# Patient Record
Sex: FEMALE | Race: WHITE | NOT HISPANIC OR LATINO | Employment: FULL TIME | URBAN - METROPOLITAN AREA
[De-identification: names, ages, dates, MRNs, and addresses within clinical notes are randomized per-mention and may not be internally consistent; named-entity substitution may affect disease eponyms.]

---

## 2023-03-20 ENCOUNTER — OFFICE VISIT (OUTPATIENT)
Dept: PRIMARY CARE | Facility: CLINIC | Age: 68
End: 2023-03-20
Payer: MEDICARE

## 2023-03-20 VITALS
SYSTOLIC BLOOD PRESSURE: 122 MMHG | HEIGHT: 59 IN | BODY MASS INDEX: 22.18 KG/M2 | TEMPERATURE: 97.6 F | WEIGHT: 110 LBS | DIASTOLIC BLOOD PRESSURE: 73 MMHG | OXYGEN SATURATION: 96 % | HEART RATE: 76 BPM

## 2023-03-20 DIAGNOSIS — R05.1 ACUTE COUGH: Primary | ICD-10-CM

## 2023-03-20 DIAGNOSIS — K52.9 ACUTE GASTROENTERITIS: ICD-10-CM

## 2023-03-20 PROCEDURE — 1036F TOBACCO NON-USER: CPT | Performed by: NURSE PRACTITIONER

## 2023-03-20 PROCEDURE — 99213 OFFICE O/P EST LOW 20 MIN: CPT | Performed by: NURSE PRACTITIONER

## 2023-03-20 PROCEDURE — 1159F MED LIST DOCD IN RCRD: CPT | Performed by: NURSE PRACTITIONER

## 2023-03-20 RX ORDER — LEVOTHYROXINE SODIUM 25 UG/1
TABLET ORAL
COMMUNITY
Start: 2021-08-16 | End: 2023-04-27 | Stop reason: SDUPTHER

## 2023-03-20 RX ORDER — OMEPRAZOLE 40 MG/1
40 CAPSULE, DELAYED RELEASE ORAL
Qty: 30 CAPSULE | Refills: 11 | Status: SHIPPED | OUTPATIENT
Start: 2023-03-20 | End: 2023-05-09

## 2023-03-20 RX ORDER — CLOBETASOL PROPIONATE 0.5 MG/G
CREAM TOPICAL 2 TIMES DAILY
COMMUNITY
Start: 2022-11-30 | End: 2023-05-09

## 2023-03-20 RX ORDER — LOTEPREDNOL ETABONATE 5 MG/G
OINTMENT OPHTHALMIC
COMMUNITY
Start: 2022-11-29 | End: 2023-05-09

## 2023-03-20 RX ORDER — ATORVASTATIN CALCIUM 20 MG/1
20 TABLET, FILM COATED ORAL DAILY
COMMUNITY
Start: 2022-11-02 | End: 2023-05-09

## 2023-03-20 RX ORDER — DOXYCYCLINE 100 MG/1
100 CAPSULE ORAL 2 TIMES DAILY
Qty: 14 CAPSULE | Refills: 0 | Status: SHIPPED | OUTPATIENT
Start: 2023-03-20 | End: 2023-03-27

## 2023-03-20 RX ORDER — DOXYCYCLINE 100 MG/1
CAPSULE ORAL
COMMUNITY
Start: 2023-02-04 | End: 2023-03-20 | Stop reason: ALTCHOICE

## 2023-03-20 RX ORDER — ATORVASTATIN CALCIUM 40 MG/1
1 TABLET, FILM COATED ORAL DAILY
COMMUNITY
Start: 2022-08-09 | End: 2023-08-21 | Stop reason: SDUPTHER

## 2023-03-20 RX ORDER — ALBUTEROL SULFATE 90 UG/1
AEROSOL, METERED RESPIRATORY (INHALATION)
COMMUNITY
Start: 2022-08-09 | End: 2024-01-09 | Stop reason: SDUPTHER

## 2023-03-20 RX ORDER — TRAZODONE HYDROCHLORIDE 50 MG/1
50 TABLET ORAL NIGHTLY PRN
Qty: 30 TABLET | Refills: 0 | Status: SHIPPED | OUTPATIENT
Start: 2023-03-20 | End: 2023-04-27 | Stop reason: SDUPTHER

## 2023-03-20 RX ORDER — CYCLOSPORINE 0.5 MG/ML
EMULSION OPHTHALMIC
COMMUNITY
Start: 2021-10-11

## 2023-03-20 RX ORDER — GABAPENTIN 300 MG/1
1 CAPSULE ORAL 2 TIMES DAILY
COMMUNITY
Start: 2021-08-16 | End: 2023-04-27 | Stop reason: SDUPTHER

## 2023-03-20 RX ORDER — BENZONATATE 100 MG/1
1 CAPSULE ORAL 3 TIMES DAILY PRN
COMMUNITY
Start: 2021-12-22 | End: 2023-03-20 | Stop reason: SDUPTHER

## 2023-03-20 RX ORDER — BENZONATATE 100 MG/1
100 CAPSULE ORAL 3 TIMES DAILY PRN
Qty: 30 CAPSULE | Refills: 0 | Status: SHIPPED | OUTPATIENT
Start: 2023-03-20 | End: 2023-05-09

## 2023-03-20 ASSESSMENT — PATIENT HEALTH QUESTIONNAIRE - PHQ9
1. LITTLE INTEREST OR PLEASURE IN DOING THINGS: NOT AT ALL
2. FEELING DOWN, DEPRESSED OR HOPELESS: NOT AT ALL
SUM OF ALL RESPONSES TO PHQ9 QUESTIONS 1 AND 2: 0

## 2023-03-20 ASSESSMENT — ENCOUNTER SYMPTOMS
DEPRESSION: 0
OCCASIONAL FEELINGS OF UNSTEADINESS: 0
LOSS OF SENSATION IN FEET: 0

## 2023-03-20 NOTE — PROGRESS NOTES
Subjective   Diane Goetz is a 67 y.o. female who presents for evaluation of URI.     Diane had COVID back in 2021. Since this time she has complained of being more susceptible to smells from chemicals. These cause her to cough. She follows with [facility] pulmonologist Dr. Lane. She had a CT chest recently and has a follow up next week to review. Last CT available in chart that I can see is from Jan 2022.   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 Covarrubias at [facility] Next Tuesday.   She is on albuterol 1x daily  She is on trellegy elliptia.  Currently 4 cigs a day.  She started working PT at [employer]      Objective   There were no vitals taken for this visit.  Physical Exam  Constitutional:       Appearance: Normal appearance.   HENT:      Right Ear: Tympanic membrane normal.      Left Ear: Tympanic membrane normal.   Eyes:      Conjunctiva/sclera: Conjunctivae normal.   Cardiovascular:      Rate and Rhythm: Normal rate and regular rhythm.   Pulmonary:      Effort: Pulmonary effort is normal.      Breath sounds: Normal breath sounds.   Abdominal:      Palpations: Abdomen is soft.   Neurological:      Mental Status: She is alert.   Psychiatric:         Mood and Affect: Mood normal.         Thought Content: Thought content normal.          Assessment/Plan   Diagnoses and all orders for this visit:  Acute cough  -     traZODone (Desyrel) 50 mg tablet; Take 1 tablet (50 mg) by mouth as needed at bedtime for sleep.  -     doxycycline (Vibramycin) 100 mg capsule; Take 1 capsule (100 mg) by mouth in the morning and 1 capsule (100 mg) before bedtime. Do all this for 7 days. Take with at least 8 ounces (large glass) of water, do not lie down for 30 minutes after.  -     benzonatate (Tessalon) 100 mg capsule; Take 1 capsule (100 mg) by mouth 3 times a day as needed for cough.  Acute gastroenteritis  -     omeprazole (PriLOSEC) 40 mg DR capsule; Take 1 capsule (40 mg) by mouth once daily in the  morning. Take before meals. Do not crush or chew.  Nasal rinses, humification/hot shower until mucous drains, increase fluid intake aiming for half your body weight in oz of water daily. This will help to thin mucous secretion and replace fluids that have been lost.  Warm, moist air, nasal saline hydration, avoidance of nasal irritants including cigarette smoke.  Sip hot or warm drinks, this may soothe nasal passages  Avoid extremely cold and dry air  Tylenol/Advil for muscle aches and fever.  Warm salt water gargles, throat lozenges and popsicle's for any sore throat.    Return in 1 week if not improving.

## 2023-03-29 ENCOUNTER — TELEPHONE (OUTPATIENT)
Dept: PRIMARY CARE | Facility: CLINIC | Age: 68
End: 2023-03-29
Payer: MEDICAID

## 2023-03-30 ENCOUNTER — TELEPHONE (OUTPATIENT)
Dept: PRIMARY CARE | Facility: CLINIC | Age: 68
End: 2023-03-30
Payer: MEDICAID

## 2023-03-30 NOTE — TELEPHONE ENCOUNTER
Pt called needing date of a lung something? I had a hard time hearing her on the phone. She stated she saw Barbi and I think she order it.

## 2023-03-30 NOTE — TELEPHONE ENCOUNTER
Pt needed the CT scan done on 01.20.22 faxed to Dr. Gallardo    Fax: [phone]    Faxed. Conformation received.

## 2023-04-20 ENCOUNTER — OFFICE VISIT (OUTPATIENT)
Dept: PRIMARY CARE | Facility: CLINIC | Age: 68
End: 2023-04-20
Payer: MEDICARE

## 2023-04-20 VITALS
TEMPERATURE: 98.1 F | RESPIRATION RATE: 16 BRPM | WEIGHT: 107.38 LBS | HEIGHT: 59 IN | HEART RATE: 90 BPM | BODY MASS INDEX: 21.65 KG/M2 | OXYGEN SATURATION: 94 % | SYSTOLIC BLOOD PRESSURE: 104 MMHG | DIASTOLIC BLOOD PRESSURE: 66 MMHG

## 2023-04-20 DIAGNOSIS — K21.9 GASTROESOPHAGEAL REFLUX DISEASE WITHOUT ESOPHAGITIS: Primary | ICD-10-CM

## 2023-04-20 DIAGNOSIS — J44.9 CHRONIC OBSTRUCTIVE PULMONARY DISEASE, UNSPECIFIED COPD TYPE (MULTI): ICD-10-CM

## 2023-04-20 DIAGNOSIS — E03.9 HYPOTHYROIDISM, UNSPECIFIED TYPE: ICD-10-CM

## 2023-04-20 DIAGNOSIS — Z12.11 ENCOUNTER FOR COLORECTAL CANCER SCREENING: ICD-10-CM

## 2023-04-20 DIAGNOSIS — Z00.00 HEALTHCARE MAINTENANCE: ICD-10-CM

## 2023-04-20 DIAGNOSIS — E78.5 HYPERLIPIDEMIA, MILD: ICD-10-CM

## 2023-04-20 DIAGNOSIS — Z12.12 ENCOUNTER FOR COLORECTAL CANCER SCREENING: ICD-10-CM

## 2023-04-20 DIAGNOSIS — G62.9 NEUROPATHY: ICD-10-CM

## 2023-04-20 DIAGNOSIS — Z11.59 NEED FOR HEPATITIS C SCREENING TEST: ICD-10-CM

## 2023-04-20 PROBLEM — H90.3 BILATERAL HIGH FREQUENCY SENSORINEURAL HEARING LOSS: Status: ACTIVE | Noted: 2023-04-20

## 2023-04-20 PROBLEM — M26.629 TMJ SYNDROME: Status: ACTIVE | Noted: 2023-04-20

## 2023-04-20 PROBLEM — C14.0 THROAT CANCER (MULTI): Status: ACTIVE | Noted: 2023-04-20

## 2023-04-20 PROBLEM — M85.80 OSTEOPENIA: Status: ACTIVE | Noted: 2023-04-20

## 2023-04-20 PROCEDURE — 99214 OFFICE O/P EST MOD 30 MIN: CPT | Performed by: STUDENT IN AN ORGANIZED HEALTH CARE EDUCATION/TRAINING PROGRAM

## 2023-04-20 PROCEDURE — 1159F MED LIST DOCD IN RCRD: CPT | Performed by: STUDENT IN AN ORGANIZED HEALTH CARE EDUCATION/TRAINING PROGRAM

## 2023-04-20 PROCEDURE — 1036F TOBACCO NON-USER: CPT | Performed by: STUDENT IN AN ORGANIZED HEALTH CARE EDUCATION/TRAINING PROGRAM

## 2023-04-20 PROCEDURE — 1170F FXNL STATUS ASSESSED: CPT | Performed by: STUDENT IN AN ORGANIZED HEALTH CARE EDUCATION/TRAINING PROGRAM

## 2023-04-20 PROCEDURE — G0439 PPPS, SUBSEQ VISIT: HCPCS | Performed by: STUDENT IN AN ORGANIZED HEALTH CARE EDUCATION/TRAINING PROGRAM

## 2023-04-20 PROCEDURE — 1160F RVW MEDS BY RX/DR IN RCRD: CPT | Performed by: STUDENT IN AN ORGANIZED HEALTH CARE EDUCATION/TRAINING PROGRAM

## 2023-04-20 RX ORDER — ACETAMINOPHEN 325 MG/1
650 TABLET ORAL EVERY 6 HOURS PRN
COMMUNITY
End: 2024-01-09 | Stop reason: SDUPTHER

## 2023-04-20 RX ORDER — PANTOPRAZOLE SODIUM 20 MG/1
20 TABLET, DELAYED RELEASE ORAL
COMMUNITY
Start: 2021-01-18 | End: 2023-04-20 | Stop reason: SDUPTHER

## 2023-04-20 RX ORDER — PANTOPRAZOLE SODIUM 20 MG/1
20 TABLET, DELAYED RELEASE ORAL
Qty: 90 TABLET | Refills: 3 | Status: SHIPPED | OUTPATIENT
Start: 2023-04-20 | End: 2023-05-09

## 2023-04-20 SDOH — ECONOMIC STABILITY: HOUSING INSECURITY
IN THE LAST 12 MONTHS, WAS THERE A TIME WHEN YOU DID NOT HAVE A STEADY PLACE TO SLEEP OR SLEPT IN A SHELTER (INCLUDING NOW)?: NO

## 2023-04-20 SDOH — ECONOMIC STABILITY: FOOD INSECURITY: WITHIN THE PAST 12 MONTHS, THE FOOD YOU BOUGHT JUST DIDN'T LAST AND YOU DIDN'T HAVE MONEY TO GET MORE.: NEVER TRUE

## 2023-04-20 SDOH — ECONOMIC STABILITY: FOOD INSECURITY: WITHIN THE PAST 12 MONTHS, YOU WORRIED THAT YOUR FOOD WOULD RUN OUT BEFORE YOU GOT MONEY TO BUY MORE.: NEVER TRUE

## 2023-04-20 SDOH — HEALTH STABILITY: PHYSICAL HEALTH: ON AVERAGE, HOW MANY MINUTES DO YOU ENGAGE IN EXERCISE AT THIS LEVEL?: 0 MIN

## 2023-04-20 SDOH — ECONOMIC STABILITY: INCOME INSECURITY: IN THE LAST 12 MONTHS, WAS THERE A TIME WHEN YOU WERE NOT ABLE TO PAY THE MORTGAGE OR RENT ON TIME?: NO

## 2023-04-20 SDOH — HEALTH STABILITY: PHYSICAL HEALTH: ON AVERAGE, HOW MANY DAYS PER WEEK DO YOU ENGAGE IN MODERATE TO STRENUOUS EXERCISE (LIKE A BRISK WALK)?: 0 DAYS

## 2023-04-20 SDOH — ECONOMIC STABILITY: TRANSPORTATION INSECURITY
IN THE PAST 12 MONTHS, HAS THE LACK OF TRANSPORTATION KEPT YOU FROM MEDICAL APPOINTMENTS OR FROM GETTING MEDICATIONS?: NO

## 2023-04-20 SDOH — ECONOMIC STABILITY: TRANSPORTATION INSECURITY
IN THE PAST 12 MONTHS, HAS LACK OF TRANSPORTATION KEPT YOU FROM MEETINGS, WORK, OR FROM GETTING THINGS NEEDED FOR DAILY LIVING?: NO

## 2023-04-20 ASSESSMENT — PATIENT HEALTH QUESTIONNAIRE - PHQ9
2. FEELING DOWN, DEPRESSED OR HOPELESS: NOT AT ALL
SUM OF ALL RESPONSES TO PHQ9 QUESTIONS 1 AND 2: 0
1. LITTLE INTEREST OR PLEASURE IN DOING THINGS: NOT AT ALL

## 2023-04-20 ASSESSMENT — ENCOUNTER SYMPTOMS
LOSS OF SENSATION IN FEET: 0
DEPRESSION: 0
OCCASIONAL FEELINGS OF UNSTEADINESS: 0

## 2023-04-20 ASSESSMENT — SOCIAL DETERMINANTS OF HEALTH (SDOH)
HOW OFTEN DO YOU ATTEND CHURCH OR RELIGIOUS SERVICES?: NEVER
WITHIN THE LAST YEAR, HAVE YOU BEEN KICKED, HIT, SLAPPED, OR OTHERWISE PHYSICALLY HURT BY YOUR PARTNER OR EX-PARTNER?: NO
WITHIN THE LAST YEAR, HAVE YOU BEEN AFRAID OF YOUR PARTNER OR EX-PARTNER?: NO
IN A TYPICAL WEEK, HOW MANY TIMES DO YOU TALK ON THE PHONE WITH FAMILY, FRIENDS, OR NEIGHBORS?: MORE THAN THREE TIMES A WEEK
WITHIN THE LAST YEAR, HAVE TO BEEN RAPED OR FORCED TO HAVE ANY KIND OF SEXUAL ACTIVITY BY YOUR PARTNER OR EX-PARTNER?: NO
HOW HARD IS IT FOR YOU TO PAY FOR THE VERY BASICS LIKE FOOD, HOUSING, MEDICAL CARE, AND HEATING?: NOT HARD AT ALL
DO YOU BELONG TO ANY CLUBS OR ORGANIZATIONS SUCH AS CHURCH GROUPS UNIONS, FRATERNAL OR ATHLETIC GROUPS, OR SCHOOL GROUPS?: NO
HOW OFTEN DO YOU GET TOGETHER WITH FRIENDS OR RELATIVES?: ONCE A WEEK
HOW OFTEN DO YOU ATTENT MEETINGS OF THE CLUB OR ORGANIZATION YOU BELONG TO?: NEVER
WITHIN THE LAST YEAR, HAVE YOU BEEN HUMILIATED OR EMOTIONALLY ABUSED IN OTHER WAYS BY YOUR PARTNER OR EX-PARTNER?: NO

## 2023-04-20 ASSESSMENT — LIFESTYLE VARIABLES
HOW MANY STANDARD DRINKS CONTAINING ALCOHOL DO YOU HAVE ON A TYPICAL DAY: PATIENT DOES NOT DRINK
AUDIT-C TOTAL SCORE: 0
HOW OFTEN DO YOU HAVE A DRINK CONTAINING ALCOHOL: NEVER
SKIP TO QUESTIONS 9-10: 1
HOW OFTEN DO YOU HAVE SIX OR MORE DRINKS ON ONE OCCASION: NEVER

## 2023-04-20 ASSESSMENT — ACTIVITIES OF DAILY LIVING (ADL)
DRESSING: INDEPENDENT
BATHING: INDEPENDENT

## 2023-04-20 NOTE — PROGRESS NOTES
Subjective   Diane Goetz is a 67 y.o. female who is here for a routine exam.  Active Problem List      Comprehensive Medical/Surgical/Social/Family History  Past Medical History:   Diagnosis Date    Personal history of other diseases of the respiratory system     History of chronic obstructive lung disease    Presence of dental prosthetic device (complete) (partial)     Full dentures     Past Surgical History:   Procedure Laterality Date    OTHER SURGICAL HISTORY  08/16/2021    Wrist surgery     Social History     Social History Narrative    Not on file         Allergies and Medications  Montelukast  Current Outpatient Medications on File Prior to Visit   Medication Sig Dispense Refill    acetaminophen (Tylenol) 325 mg tablet Take 2 tablets (650 mg) by mouth every 6 hours if needed.      albuterol 90 mcg/actuation inhaler Inhale.      atorvastatin (Lipitor) 40 mg tablet Take 1 tablet (40 mg) by mouth once daily.      benzonatate (Tessalon) 100 mg capsule Take 1 capsule (100 mg) by mouth 3 times a day as needed for cough. 30 capsule 0    cycloSPORINE (Restasis) 0.05 % ophthalmic emulsion Administer into affected eye(s).      fluticasone-umeclidin-vilanter (TRELEGY-ELLIPTA) 100-62.5-25 mcg blister with device Inhale 1 puff once daily.      gabapentin (Neurontin) 300 mg capsule Take 1 capsule (300 mg) by mouth in the morning and 1 capsule (300 mg) before bedtime.      LevoxyL 25 mcg tablet Take by mouth.      Lotemax 0.5 % ointment APPLY OINTMENT TO EYELIDS AT BEDTIME AS NEEDED      omeprazole (PriLOSEC) 40 mg DR capsule Take 1 capsule (40 mg) by mouth once daily in the morning. Take before meals. Do not crush or chew. 30 capsule 11    traZODone (Desyrel) 50 mg tablet Take 1 tablet (50 mg) by mouth as needed at bedtime for sleep. 30 tablet 0    [DISCONTINUED] pantoprazole (ProtoNix) 20 mg EC tablet Take 1 tablet (20 mg) by mouth once daily.      atorvastatin (Lipitor) 20 mg tablet Take 1 tablet (20 mg) by mouth once  "daily. as directed      clobetasol (Temovate) 0.05 % cream Apply topically twice a day.       No current facility-administered medications on file prior to visit.     Lung Problems - COPD, following up Nodules. On trelegy, no recent exacerbations.   Intermittent chest heaviness    The patient seen today for establishing care and for annual wellness visit.  She is trying family physician.  Significant history of hypothyroidism, COPD.  She does follow with pulmonology.    She is on several maintenance medications including gabapentin for neuropathy, PPI for GERD, and trazodone for sleep.        Lifestyle  Diet: Well-balanced,  Exercise: Inactive  Tobacco: Intermittent use, trying to quit    Alcohol: None  Stress/Work: No significant stressors      Colorectal Screening: Due for Cologuard this year    Breast Screening: Up-to-date 2022  History of abnormal mammogram: no  Family history of breast cancer: no    Abnormal uterine bleeding: None  Urinary incontinence: None    Dexa Scan: Up-to-date last year    Review of Systems   Constitutional:  Negative for appetite change, fatigue and fever.   HENT:  Negative for ear discharge, ear pain, hearing loss, postnasal drip, rhinorrhea and sinus pain.    Eyes:  Negative for visual disturbance.   Respiratory:  Negative for shortness of breath.    Cardiovascular:  Negative for chest pain, palpitations and leg swelling.   Gastrointestinal:  Negative for abdominal pain, blood in stool, constipation, diarrhea, nausea and vomiting.   Genitourinary:  Negative for flank pain and hematuria.   Musculoskeletal:  Negative for arthralgias and myalgias.   Skin:  Negative for rash.   Neurological:  Negative for dizziness and light-headedness.   All other systems reviewed and are negative.      Objective   /66 (BP Location: Right arm, Patient Position: Sitting)   Pulse 90   Temp 36.7 °C (98.1 °F) (Temporal)   Resp 16   Ht 1.499 m (4' 11\")   Wt 48.7 kg (107 lb 6 oz)   SpO2 94%   BMI " 21.69 kg/m²     Physical Exam  Vitals reviewed.   Constitutional:       General: She is not in acute distress.     Appearance: Normal appearance. She is normal weight. She is not toxic-appearing.   HENT:      Head: Normocephalic and atraumatic.      Right Ear: Tympanic membrane and ear canal normal.      Left Ear: Tympanic membrane and ear canal normal.      Nose: Nose normal. No congestion or rhinorrhea.      Mouth/Throat:      Mouth: Mucous membranes are dry.   Eyes:      General: No scleral icterus.     Extraocular Movements: Extraocular movements intact.      Conjunctiva/sclera: Conjunctivae normal.      Pupils: Pupils are equal, round, and reactive to light.   Cardiovascular:      Rate and Rhythm: Normal rate and regular rhythm.      Heart sounds: No murmur heard.     No friction rub. No gallop.   Pulmonary:      Effort: Pulmonary effort is normal. No respiratory distress.      Breath sounds: Normal breath sounds. No wheezing, rhonchi or rales.   Abdominal:      General: Abdomen is flat. There is no distension.      Palpations: Abdomen is soft.      Tenderness: There is no abdominal tenderness. There is no guarding.   Musculoskeletal:         General: Normal range of motion.      Cervical back: Normal range of motion and neck supple.      Right lower leg: No edema.      Left lower leg: No edema.   Lymphadenopathy:      Cervical: No cervical adenopathy.   Skin:     General: Skin is warm and dry.   Neurological:      General: No focal deficit present.      Mental Status: She is alert and oriented to person, place, and time.   Psychiatric:         Mood and Affect: Mood normal.         Behavior: Behavior normal.         Assessment/Plan   Problem List Items Addressed This Visit          Nervous    Neuropathy    Relevant Orders    CBC    Comprehensive Metabolic Panel       Respiratory    COPD (chronic obstructive pulmonary disease) (CMS/HCC)       Endocrine/Metabolic    Hypothyroid    Relevant Orders    TSH with  reflex to Free T4 if abnormal       Other    Hyperlipidemia, mild    Relevant Orders    Lipid Panel     Other Visit Diagnoses       Gastroesophageal reflux disease without esophagitis    -  Primary    Relevant Medications    pantoprazole (ProtoNix) 20 mg EC tablet    Healthcare maintenance        Need for hepatitis C screening test        Relevant Orders    Hepatitis C Antibody    Encounter for colorectal cancer screening        Relevant Orders    Cologuard® colon cancer screening            Reviewed Social Determinants of health with patient, discussed healthy lifestyle including 150 minutes of physical activity per week  Ordered/Reviewed baseline labwork -CBC, CMP, Lipid Panel  Immunizations Up-to-Date  Pap smear Up-to-Date  Mammogram up-to-date follows with OB/GYN  Dexa performed last year  Ordered Cologuard this year  Continue maintenance medications, continue follow-up with pulmonology  Follow-up as new concerns arise

## 2023-04-22 ASSESSMENT — ENCOUNTER SYMPTOMS
CONSTIPATION: 0
SHORTNESS OF BREATH: 0
FLANK PAIN: 0
PALPITATIONS: 0
FEVER: 0
NAUSEA: 0
ABDOMINAL PAIN: 0
BLOOD IN STOOL: 0
HEMATURIA: 0
MYALGIAS: 0
VOMITING: 0
FATIGUE: 0
SINUS PAIN: 0
APPETITE CHANGE: 0
RHINORRHEA: 0
DIARRHEA: 0
LIGHT-HEADEDNESS: 0
ARTHRALGIAS: 0
DIZZINESS: 0

## 2023-04-27 ENCOUNTER — OFFICE VISIT (OUTPATIENT)
Dept: PRIMARY CARE | Facility: CLINIC | Age: 68
End: 2023-04-27
Payer: MEDICARE

## 2023-04-27 VITALS
RESPIRATION RATE: 16 BRPM | BODY MASS INDEX: 21.61 KG/M2 | OXYGEN SATURATION: 93 % | SYSTOLIC BLOOD PRESSURE: 109 MMHG | WEIGHT: 107 LBS | HEART RATE: 78 BPM | DIASTOLIC BLOOD PRESSURE: 67 MMHG | TEMPERATURE: 98 F

## 2023-04-27 DIAGNOSIS — E03.9 HYPOTHYROIDISM, UNSPECIFIED TYPE: ICD-10-CM

## 2023-04-27 DIAGNOSIS — B07.0 PLANTAR WART OF BOTH FEET: Primary | ICD-10-CM

## 2023-04-27 DIAGNOSIS — R05.1 ACUTE COUGH: ICD-10-CM

## 2023-04-27 DIAGNOSIS — G62.9 NEUROPATHY: ICD-10-CM

## 2023-04-27 DIAGNOSIS — F41.1 ANXIETY STATE: ICD-10-CM

## 2023-04-27 PROCEDURE — 99214 OFFICE O/P EST MOD 30 MIN: CPT | Performed by: STUDENT IN AN ORGANIZED HEALTH CARE EDUCATION/TRAINING PROGRAM

## 2023-04-27 PROCEDURE — 1036F TOBACCO NON-USER: CPT | Performed by: STUDENT IN AN ORGANIZED HEALTH CARE EDUCATION/TRAINING PROGRAM

## 2023-04-27 PROCEDURE — 17000 DESTRUCT PREMALG LESION: CPT | Performed by: STUDENT IN AN ORGANIZED HEALTH CARE EDUCATION/TRAINING PROGRAM

## 2023-04-27 PROCEDURE — 1160F RVW MEDS BY RX/DR IN RCRD: CPT | Performed by: STUDENT IN AN ORGANIZED HEALTH CARE EDUCATION/TRAINING PROGRAM

## 2023-04-27 PROCEDURE — 17003 DESTRUCT PREMALG LES 2-14: CPT | Performed by: STUDENT IN AN ORGANIZED HEALTH CARE EDUCATION/TRAINING PROGRAM

## 2023-04-27 PROCEDURE — 1159F MED LIST DOCD IN RCRD: CPT | Performed by: STUDENT IN AN ORGANIZED HEALTH CARE EDUCATION/TRAINING PROGRAM

## 2023-04-27 RX ORDER — TRAZODONE HYDROCHLORIDE 50 MG/1
50 TABLET ORAL NIGHTLY PRN
Qty: 30 TABLET | Refills: 0 | Status: SHIPPED | OUTPATIENT
Start: 2023-04-27 | End: 2023-08-21 | Stop reason: ALTCHOICE

## 2023-04-27 RX ORDER — LEVOTHYROXINE SODIUM 25 UG/1
25 TABLET ORAL
Qty: 90 TABLET | Refills: 3 | Status: SHIPPED | OUTPATIENT
Start: 2023-04-27 | End: 2023-05-22

## 2023-04-27 RX ORDER — GABAPENTIN 300 MG/1
300 CAPSULE ORAL 2 TIMES DAILY
Qty: 180 CAPSULE | Refills: 3 | Status: SHIPPED | OUTPATIENT
Start: 2023-04-27 | End: 2024-01-09 | Stop reason: SDUPTHER

## 2023-04-27 RX ORDER — FLUTICASONE PROPIONATE 50 MCG
1 SPRAY, SUSPENSION (ML) NASAL DAILY
Qty: 16 G | Refills: 11 | Status: SHIPPED | OUTPATIENT
Start: 2023-04-27 | End: 2024-01-09 | Stop reason: SDUPTHER

## 2023-04-27 ASSESSMENT — ENCOUNTER SYMPTOMS
DEPRESSION: 0
OCCASIONAL FEELINGS OF UNSTEADINESS: 0
LOSS OF SENSATION IN FEET: 0

## 2023-04-27 NOTE — PROGRESS NOTES
Subjective   Patient ID: Diane Goetz is a 67 y.o. female who presents for Foreign Body in Skin (Pt here for warts on both feet).    HPI   Presenting for multiple irritated warts on bilateral feet. Patient reports warts have been present for many years, and she has attempted to cut them out, but they return and continue to grow. She has tried OTC wart patches without improvement as well. Denies lesions elsewhere on her body other than her feet. She has felt otherwise well, with no additional concerns today.    Review of Systems  12 point ROS completed and otherwise unremarkable unless stated above.    Objective   /67 (BP Location: Right arm, Patient Position: Sitting)   Pulse 78   Temp 36.7 °C (98 °F) (Temporal)   Resp 16   Wt 48.5 kg (107 lb)   SpO2 93%   BMI 21.61 kg/m²     Physical Exam  Constitutional: Well developed, awake, alert, oriented x3  Head and Face: NCAT  Eyes: Normal external exam, EOMI  ENT: MMM  Cardiovascular: well perfused, no edema of extremities  Pulmonary: no respiratory distress.  Abdomen: nondistended  MSK: No joint swelling, normal movements of all extremities. Range of motion- normal.  Skin:   Right - large plantar wart on ball of right foot with significant callus formation, two small plantar warts medial arch, two small warts medial side of 2nd toe  Left - lateral arch plantar wart with callus formation  Psychiatric: Judgment intact. Appropriate mood and behavior      Patient ID: Diane Goetz is a 67 y.o. female.    Destruction of lesion    Date/Time: 4/27/2023 4:22 PM    Performed by: Chelsea Rojas DO  Authorized by: Rudy Howe DO    Number of Lesions: 5  Lesion 1:     Body area: lower extremity    Lower extremity location: R foot    Malignancy: benign lesion      Destruction method: cryotherapy    Lesion 2:     Body area: lower extremity    Lower extremity location: R foot    Malignancy: benign lesion      Destruction method: cryotherapy    Lesion 3:     Body area:  lower extremity    Lower extremity location: R foot    Malignancy: benign lesion      Destruction method: cryotherapy    Lesion 4:     Body area: lower extremity    Lower extremity location: R second toe    Malignancy: benign lesion      Destruction method: cryotherapy    Lesion 5:     Body area: lower extremity    Lower extremity location: R second toe    Malignancy: benign lesion      Destruction method: cryotherapy      Comments:  11 blade utilized to remove callus prior to cryotherapy.  Destruction of lesion    Date/Time: 4/27/2023 4:23 PM    Performed by: Chelsea Rojas DO  Authorized by: Rudy Howe DO    Number of Lesions: 1  Lesion 1:     Body area: lower extremity    Lower extremity location: L foot    Malignancy: benign lesion      Destruction method: cryotherapy      Comments:  11 blade utilized to remove callus prior to cryotherapy.    Assessment/Plan   Problem List Items Addressed This Visit          Nervous    Neuropathy    Relevant Medications    gabapentin (Neurontin) 300 mg capsule       Musculoskeletal    Plantar wart of both feet - Primary       Endocrine/Metabolic    Hypothyroid    Relevant Medications    LevoxyL 25 mcg tablet       Other    Anxiety state    Relevant Medications    traZODone (Desyrel) 50 mg tablet    Acute cough    Relevant Medications    fluticasone (Flonase) 50 mcg/actuation nasal spray   Destruction of bilateral plantar warts as above, procedure tolerated well.  Medications refilled as requested.  Patient to follow-up in 2-4 weeks for repeat cryotherapy as needed.    Chelsea Rojas DO PGY3

## 2023-04-29 NOTE — PROGRESS NOTES
I saw and evaluated the patient. I personally obtained the key and critical portions of the history and physical exam or was physically present for key and critical portions performed by the resident/fellow. I reviewed the resident/fellow's documentation and discussed the patient with the resident/fellow. I agree with the resident/fellow's medical decision making as documented in the note.    Rudy Howe, DO

## 2023-05-02 ENCOUNTER — LAB (OUTPATIENT)
Dept: LAB | Facility: LAB | Age: 68
End: 2023-05-02
Payer: MEDICARE

## 2023-05-02 DIAGNOSIS — G62.9 NEUROPATHY: ICD-10-CM

## 2023-05-02 DIAGNOSIS — E78.5 HYPERLIPIDEMIA, MILD: ICD-10-CM

## 2023-05-02 DIAGNOSIS — Z11.59 NEED FOR HEPATITIS C SCREENING TEST: ICD-10-CM

## 2023-05-02 DIAGNOSIS — E03.9 HYPOTHYROIDISM, UNSPECIFIED TYPE: ICD-10-CM

## 2023-05-02 LAB
ERYTHROCYTE DISTRIBUTION WIDTH (RATIO) BY AUTOMATED COUNT: 15.6 % (ref 11.5–14.5)
ERYTHROCYTE MEAN CORPUSCULAR HEMOGLOBIN CONCENTRATION (G/DL) BY AUTOMATED: 30.8 G/DL (ref 32–36)
ERYTHROCYTE MEAN CORPUSCULAR VOLUME (FL) BY AUTOMATED COUNT: 87 FL (ref 80–100)
ERYTHROCYTES (10*6/UL) IN BLOOD BY AUTOMATED COUNT: 5.21 X10E12/L (ref 4–5.2)
HEMATOCRIT (%) IN BLOOD BY AUTOMATED COUNT: 45.1 % (ref 36–46)
HEMOGLOBIN (G/DL) IN BLOOD: 13.9 G/DL (ref 12–16)
HEPATITIS C VIRUS AB PRESENCE IN SERUM: NONREACTIVE
LEUKOCYTES (10*3/UL) IN BLOOD BY AUTOMATED COUNT: 6.3 X10E9/L (ref 4.4–11.3)
NRBC (PER 100 WBCS) BY AUTOMATED COUNT: 0 /100 WBC (ref 0–0)
PLATELETS (10*3/UL) IN BLOOD AUTOMATED COUNT: 252 X10E9/L (ref 150–450)

## 2023-05-02 PROCEDURE — 85027 COMPLETE CBC AUTOMATED: CPT

## 2023-05-02 PROCEDURE — 86803 HEPATITIS C AB TEST: CPT

## 2023-05-02 PROCEDURE — 84443 ASSAY THYROID STIM HORMONE: CPT

## 2023-05-02 PROCEDURE — 80061 LIPID PANEL: CPT

## 2023-05-02 PROCEDURE — 36415 COLL VENOUS BLD VENIPUNCTURE: CPT

## 2023-05-02 PROCEDURE — 80053 COMPREHEN METABOLIC PANEL: CPT

## 2023-05-03 LAB
ALANINE AMINOTRANSFERASE (SGPT) (U/L) IN SER/PLAS: 10 U/L (ref 7–45)
ALBUMIN (G/DL) IN SER/PLAS: 4.2 G/DL (ref 3.4–5)
ALKALINE PHOSPHATASE (U/L) IN SER/PLAS: 114 U/L (ref 33–136)
ANION GAP IN SER/PLAS: 14 MMOL/L (ref 10–20)
ASPARTATE AMINOTRANSFERASE (SGOT) (U/L) IN SER/PLAS: 15 U/L (ref 9–39)
BILIRUBIN TOTAL (MG/DL) IN SER/PLAS: 0.5 MG/DL (ref 0–1.2)
CALCIUM (MG/DL) IN SER/PLAS: 9.2 MG/DL (ref 8.6–10.6)
CARBON DIOXIDE, TOTAL (MMOL/L) IN SER/PLAS: 25 MMOL/L (ref 21–32)
CHLORIDE (MMOL/L) IN SER/PLAS: 106 MMOL/L (ref 98–107)
CHOLESTEROL (MG/DL) IN SER/PLAS: 234 MG/DL (ref 0–199)
CHOLESTEROL IN HDL (MG/DL) IN SER/PLAS: 61.3 MG/DL
CHOLESTEROL/HDL RATIO: 3.8
CREATININE (MG/DL) IN SER/PLAS: 0.52 MG/DL (ref 0.5–1.05)
GFR FEMALE: >90 ML/MIN/1.73M2
GLUCOSE (MG/DL) IN SER/PLAS: 87 MG/DL (ref 74–99)
LDL: 152 MG/DL (ref 0–99)
POTASSIUM (MMOL/L) IN SER/PLAS: 4.1 MMOL/L (ref 3.5–5.3)
PROTEIN TOTAL: 7 G/DL (ref 6.4–8.2)
SODIUM (MMOL/L) IN SER/PLAS: 141 MMOL/L (ref 136–145)
THYROTROPIN (MIU/L) IN SER/PLAS BY DETECTION LIMIT <= 0.05 MIU/L: 3.54 MIU/L (ref 0.44–3.98)
TRIGLYCERIDE (MG/DL) IN SER/PLAS: 104 MG/DL (ref 0–149)
UREA NITROGEN (MG/DL) IN SER/PLAS: 13 MG/DL (ref 6–23)
VLDL: 21 MG/DL (ref 0–40)

## 2023-05-04 ENCOUNTER — TELEPHONE (OUTPATIENT)
Dept: PRIMARY CARE | Facility: CLINIC | Age: 68
End: 2023-05-04
Payer: MEDICARE

## 2023-05-04 NOTE — TELEPHONE ENCOUNTER
----- Message from Judith Lundberg MA sent at 5/4/2023 11:03 AM EDT -----  [id number]  ----- Message -----  From: Rudy Howe DO  Sent: 5/3/2023  10:12 PM EDT  To: Judith Lundberg MA    Please call patient back with lab results, let her know overall they do look good, there is a persistent elevation in cholesterol.  I do recommend continuing on the 40 mg only of the atorvastatin.    No additional follow-up necessary at this time.

## 2023-05-09 ENCOUNTER — OFFICE VISIT (OUTPATIENT)
Dept: PRIMARY CARE | Facility: CLINIC | Age: 68
End: 2023-05-09
Payer: MEDICARE

## 2023-05-09 VITALS
OXYGEN SATURATION: 96 % | BODY MASS INDEX: 21.37 KG/M2 | HEIGHT: 59 IN | HEART RATE: 87 BPM | SYSTOLIC BLOOD PRESSURE: 108 MMHG | DIASTOLIC BLOOD PRESSURE: 69 MMHG | RESPIRATION RATE: 18 BRPM | TEMPERATURE: 97.8 F | WEIGHT: 106 LBS

## 2023-05-09 DIAGNOSIS — M54.41 ACUTE RIGHT-SIDED LOW BACK PAIN WITH RIGHT-SIDED SCIATICA: Primary | ICD-10-CM

## 2023-05-09 DIAGNOSIS — J06.9 UPPER RESPIRATORY TRACT INFECTION, UNSPECIFIED TYPE: ICD-10-CM

## 2023-05-09 PROCEDURE — 99214 OFFICE O/P EST MOD 30 MIN: CPT

## 2023-05-09 PROCEDURE — 96372 THER/PROPH/DIAG INJ SC/IM: CPT

## 2023-05-09 PROCEDURE — 1160F RVW MEDS BY RX/DR IN RCRD: CPT

## 2023-05-09 PROCEDURE — 1159F MED LIST DOCD IN RCRD: CPT

## 2023-05-09 RX ORDER — CYCLOBENZAPRINE HCL 5 MG
5 TABLET ORAL 3 TIMES DAILY
Qty: 21 TABLET | Refills: 0 | Status: SHIPPED | OUTPATIENT
Start: 2023-05-09 | End: 2023-05-16

## 2023-05-09 RX ORDER — OMEPRAZOLE 20 MG/1
20 TABLET, DELAYED RELEASE ORAL
COMMUNITY
Start: 2019-02-28 | End: 2023-07-18 | Stop reason: SDUPTHER

## 2023-05-09 RX ORDER — AZITHROMYCIN 250 MG/1
TABLET, FILM COATED ORAL
Qty: 6 TABLET | Refills: 0 | Status: SHIPPED | OUTPATIENT
Start: 2023-05-11 | End: 2023-05-16

## 2023-05-09 RX ORDER — KETOROLAC TROMETHAMINE 30 MG/ML
15 INJECTION, SOLUTION INTRAMUSCULAR; INTRAVENOUS ONCE
Status: COMPLETED | OUTPATIENT
Start: 2023-05-09 | End: 2023-05-09

## 2023-05-09 RX ADMIN — KETOROLAC TROMETHAMINE 15 MG: 30 INJECTION, SOLUTION INTRAMUSCULAR; INTRAVENOUS at 11:28

## 2023-05-09 ASSESSMENT — ENCOUNTER SYMPTOMS
APNEA: 0
RHINORRHEA: 1
FATIGUE: 0
SLEEP DISTURBANCE: 1
WHEEZING: 0
HEMATURIA: 0
MYALGIAS: 1
ABDOMINAL PAIN: 0
DYSURIA: 0
EYE ITCHING: 0
WEAKNESS: 0
TROUBLE SWALLOWING: 0
BACK PAIN: 1
SHORTNESS OF BREATH: 1
CHEST TIGHTNESS: 0
DIZZINESS: 0
NAUSEA: 0
SORE THROAT: 1
PALPITATIONS: 0
LIGHT-HEADEDNESS: 0
VOMITING: 0
CHILLS: 0
CHOKING: 0
COUGH: 1
FEVER: 0

## 2023-05-09 NOTE — PROGRESS NOTES
"Subjective   Patient ID: Diane Goetz is a 67 y.o. female who presents for Cough (Sx x5 days. Productive with yellow & white phlegm.) and Leg Pain (Right leg from hip to knee. Pain started Sunday. No known injury. Pain level is \"10\").   Patient Presents with right hip pain worse with sitting and laying down since Sunday. Not exacerbated by walking or climbing stairs. Has not had pain like this before, has had knee pain in right knee. On Sunday went up and down steps about 10 times,more than usual. Travels down in to leg on lateral side. Has tried three 200mg ibuprofen at night time without relief. Has tried tylenol without relief.    Also endorsing cough and runny nose, cough keeps her up at night (for past 5 days.) Yellow sputum, trelegy everyday, has used albuterol at night for the past 3 days since she has gotten sick. Had temp of 99.8 on Saturday. Has taken benzonatate for two days with minimal relief.     Has started smoking again 4- 5 cigarrtees today.         Review of Systems   Constitutional:  Negative for chills, fatigue and fever.   HENT:  Positive for congestion, postnasal drip, rhinorrhea and sore throat. Negative for ear pain, hearing loss, sneezing and trouble swallowing.    Eyes:  Negative for itching.   Respiratory:  Positive for cough and shortness of breath. Negative for apnea, choking, chest tightness and wheezing.    Cardiovascular:  Negative for chest pain and palpitations.   Gastrointestinal:  Negative for abdominal pain, nausea and vomiting.   Genitourinary:  Negative for decreased urine volume, dysuria, hematuria and urgency.   Musculoskeletal:  Positive for back pain and myalgias (Right lateral thigh, into right clf).   Skin:  Negative for rash.   Neurological:  Negative for dizziness, weakness and light-headedness.   Psychiatric/Behavioral:  Positive for sleep disturbance (Due to cough).        Objective   Physical Exam  Vitals reviewed.   HENT:      Nose: Congestion and rhinorrhea present. "      Mouth/Throat:      Mouth: Mucous membranes are moist.      Pharynx: Oropharynx is clear.   Eyes:      Conjunctiva/sclera: Conjunctivae normal.   Cardiovascular:      Rate and Rhythm: Normal rate and regular rhythm.      Pulses: Normal pulses.      Heart sounds: Normal heart sounds. No murmur heard.  Pulmonary:      Effort: Pulmonary effort is normal. No respiratory distress.      Breath sounds: Normal breath sounds. No stridor. No wheezing, rhonchi or rales.   Musculoskeletal:         General: Tenderness (Tenderness, moderate, at right sided lower back, mild tenderness of right lateral leg) present. No deformity or signs of injury.   Skin:     Capillary Refill: Capillary refill takes less than 2 seconds.   Neurological:      Mental Status: She is alert and oriented to person, place, and time.      Motor: No weakness.      Gait: Gait abnormal (Antalgic gait, right sided).      Deep Tendon Reflexes: Reflexes normal.         Assessment/Plan   Problem List Items Addressed This Visit    None  Visit Diagnoses       Acute right-sided low back pain with right-sided sciatica    -  Primary    Relevant Medications    ketorolac (Toradol) injection 15 mg (Completed)    cyclobenzaprine (Flexeril) 5 mg tablet    Upper respiratory tract infection, unspecified type        Relevant Medications    azithromycin (Zithromax) 250 mg tablet (Start on 5/11/2023)        Acute right sided low back pain with sciatica for two days without relief from ibuprofen. Will give Toradol injection in office and write for cyclobenzaprine for likely musculoskeletal back pain with radicular symptoms.     Cough is likely secondary to URI, lung exam is wnl, but if no improvement in cough, will plan to start azithromycin in two days.     Discussed return in one week if no resolution of symptoms, otherwise return as needed

## 2023-05-09 NOTE — PATIENT INSTRUCTIONS
Take cyclobenzaprine three times a day for the back and leg pain    Continue to take benzonatate for cough    IF you still have the cough without any improvement tin 2 days, I will have an antibiotic at the pharmacy that you can

## 2023-05-10 ENCOUNTER — TELEPHONE (OUTPATIENT)
Dept: PRIMARY CARE | Facility: CLINIC | Age: 68
End: 2023-05-10
Payer: MEDICARE

## 2023-05-10 DIAGNOSIS — G62.9 NEUROPATHY: ICD-10-CM

## 2023-05-10 DIAGNOSIS — G62.9 NEUROPATHY: Primary | ICD-10-CM

## 2023-05-10 LAB — NONINV COLON CA DNA+OCC BLD SCRN STL QL: NEGATIVE

## 2023-05-10 RX ORDER — PREDNISONE 10 MG/1
10 TABLET ORAL DAILY
Qty: 21 TABLET | Refills: 8 | Status: SHIPPED | OUTPATIENT
Start: 2023-05-10 | End: 2023-05-10 | Stop reason: ALTCHOICE

## 2023-05-10 RX ORDER — PREDNISONE 10 MG/1
TABLET ORAL
Qty: 32 TABLET | Refills: 0 | Status: SHIPPED | OUTPATIENT
Start: 2023-05-10 | End: 2023-05-25

## 2023-05-11 ENCOUNTER — TELEPHONE (OUTPATIENT)
Dept: PRIMARY CARE | Facility: CLINIC | Age: 68
End: 2023-05-11
Payer: MEDICARE

## 2023-05-11 NOTE — TELEPHONE ENCOUNTER
----- Message from Rudy Howe DO sent at 5/11/2023 12:05 AM EDT -----  Please let patient know the cologuard was negative (normal).  Per screening guidelines, they are good for 3 years, and will can repeat the cologuard at that time.    Thanks,  KALIN

## 2023-05-12 ENCOUNTER — TELEPHONE (OUTPATIENT)
Dept: PRIMARY CARE | Facility: CLINIC | Age: 68
End: 2023-05-12
Payer: MEDICARE

## 2023-05-19 DIAGNOSIS — E03.9 HYPOTHYROIDISM, UNSPECIFIED TYPE: ICD-10-CM

## 2023-05-20 DIAGNOSIS — R05.1 ACUTE COUGH: Primary | ICD-10-CM

## 2023-05-22 RX ORDER — LEVOTHYROXINE SODIUM 25 UG/1
TABLET ORAL
Qty: 90 TABLET | Refills: 0 | Status: SHIPPED | OUTPATIENT
Start: 2023-05-22 | End: 2023-07-14

## 2023-05-22 RX ORDER — BENZONATATE 100 MG/1
CAPSULE ORAL
Qty: 21 CAPSULE | Refills: 0 | Status: SHIPPED | OUTPATIENT
Start: 2023-05-22 | End: 2023-07-17

## 2023-06-06 ENCOUNTER — OFFICE VISIT (OUTPATIENT)
Dept: PRIMARY CARE | Facility: CLINIC | Age: 68
End: 2023-06-06
Payer: MEDICARE

## 2023-06-06 VITALS
DIASTOLIC BLOOD PRESSURE: 58 MMHG | TEMPERATURE: 97.9 F | HEART RATE: 83 BPM | RESPIRATION RATE: 16 BRPM | OXYGEN SATURATION: 94 % | SYSTOLIC BLOOD PRESSURE: 99 MMHG | BODY MASS INDEX: 21.64 KG/M2 | WEIGHT: 107.13 LBS

## 2023-06-06 DIAGNOSIS — B07.0 PLANTAR WART OF BOTH FEET: Primary | ICD-10-CM

## 2023-06-06 PROCEDURE — 1159F MED LIST DOCD IN RCRD: CPT

## 2023-06-06 PROCEDURE — 1160F RVW MEDS BY RX/DR IN RCRD: CPT

## 2023-06-06 PROCEDURE — 99213 OFFICE O/P EST LOW 20 MIN: CPT

## 2023-06-06 ASSESSMENT — ENCOUNTER SYMPTOMS
DEPRESSION: 0
LOSS OF SENSATION IN FEET: 0
OCCASIONAL FEELINGS OF UNSTEADINESS: 0

## 2023-06-06 NOTE — PROGRESS NOTES
Subjective   Patient ID: Diane Goetz is a 67 y.o. female who presents for No chief complaint on file..    Patient is here today for ongoing bilateral plantars warts.  Was previously seen by Dr. Rojas on 4/27 in which cryoablation was performed.  In the interim patient states  This is her 2nd treatment  Patient has been paring down calluses over areas affected herself.  States that only one painful at this time is on the lateral border of her left heel.  Here today for repeat cryoablation.         Review of Systems    Objective   There were no vitals taken for this visit.    Physical Exam  Skin:     Comments: Multiple warts ordered for the bilateral plantar surfaces.  Most notable on the right foot.  Has largest at anterior plantar surface on right foot measuring approximately 4 mm x 4 mm to 1 mm x 1 mm  Wart on the medial surface of the second digit of the right foot.         Assessment/Plan   Problem List Items Addressed This Visit          Musculoskeletal    Plantar wart of both feet - Primary     Areas affected skin were pared down prior to cryoablation.  Patient tolerated well.  Treated total of 5 warts on bilateral feet for on right 1 on the left.  Recommend patient keep area clean and dry, furthermore recommend that she bleach her shower and change footwear as the wart virus can be spread.  Recommend patient follow-up in 6 weeks time for reevaluation if warts fail to resolve.        Patient ID: Diane Goetz is a 67 y.o. female.    Destruction of lesion    Date/Time: 6/9/2023 2:00 AM    Performed by: Rudy Howe DO  Authorized by: Rudy Howe DO    Number of Lesions: 1  Lesion 1:     Body area: lower extremity    Lower extremity location: R knee    Malignancy: malignancy unknown      Destruction method: cryotherapy      Comments:  1 Wart  Scalpel #15  Liquid nitrogen

## 2023-06-06 NOTE — ASSESSMENT & PLAN NOTE
Areas affected skin were pared down prior to cryoablation.  Patient tolerated well.  Treated total of 5 warts on bilateral feet for on right 1 on the left.  Recommend patient keep area clean and dry, furthermore recommend that she bleach her shower and change footwear as the wart virus can be spread.  Recommend patient follow-up in 6 weeks time for reevaluation if warts fail to resolve.

## 2023-07-14 DIAGNOSIS — E03.9 HYPOTHYROIDISM, UNSPECIFIED TYPE: ICD-10-CM

## 2023-07-14 RX ORDER — LEVOTHYROXINE SODIUM 25 UG/1
TABLET ORAL
Qty: 90 TABLET | Refills: 0 | Status: SHIPPED | OUTPATIENT
Start: 2023-07-14 | End: 2023-09-07 | Stop reason: ALTCHOICE

## 2023-07-17 DIAGNOSIS — R05.1 ACUTE COUGH: ICD-10-CM

## 2023-07-17 RX ORDER — BENZONATATE 100 MG/1
CAPSULE ORAL
Qty: 21 CAPSULE | Refills: 0 | Status: SHIPPED | OUTPATIENT
Start: 2023-07-17 | End: 2023-09-25 | Stop reason: SDUPTHER

## 2023-07-18 ENCOUNTER — TELEPHONE (OUTPATIENT)
Dept: PRIMARY CARE | Facility: CLINIC | Age: 68
End: 2023-07-18
Payer: MEDICARE

## 2023-07-18 DIAGNOSIS — K21.9 GASTROESOPHAGEAL REFLUX DISEASE WITHOUT ESOPHAGITIS: Primary | ICD-10-CM

## 2023-07-18 RX ORDER — OMEPRAZOLE 20 MG/1
20 TABLET, DELAYED RELEASE ORAL
Qty: 90 TABLET | Refills: 1 | Status: SHIPPED | OUTPATIENT
Start: 2023-07-18 | End: 2023-08-21 | Stop reason: SDUPTHER

## 2023-08-21 ENCOUNTER — TELEPHONE (OUTPATIENT)
Dept: PRIMARY CARE | Facility: CLINIC | Age: 68
End: 2023-08-21

## 2023-08-21 ENCOUNTER — OFFICE VISIT (OUTPATIENT)
Dept: PRIMARY CARE | Facility: CLINIC | Age: 68
End: 2023-08-21
Payer: MEDICARE

## 2023-08-21 VITALS
DIASTOLIC BLOOD PRESSURE: 65 MMHG | TEMPERATURE: 98 F | SYSTOLIC BLOOD PRESSURE: 109 MMHG | OXYGEN SATURATION: 95 % | WEIGHT: 103.13 LBS | BODY MASS INDEX: 20.83 KG/M2 | RESPIRATION RATE: 16 BRPM | HEART RATE: 91 BPM

## 2023-08-21 DIAGNOSIS — E78.5 HYPERLIPIDEMIA, MILD: Primary | ICD-10-CM

## 2023-08-21 DIAGNOSIS — E03.9 HYPOTHYROIDISM, UNSPECIFIED TYPE: ICD-10-CM

## 2023-08-21 DIAGNOSIS — B07.0 PLANTAR WART OF BOTH FEET: Primary | ICD-10-CM

## 2023-08-21 DIAGNOSIS — G47.00 INSOMNIA, UNSPECIFIED TYPE: ICD-10-CM

## 2023-08-21 DIAGNOSIS — J44.9 CHRONIC OBSTRUCTIVE PULMONARY DISEASE, UNSPECIFIED COPD TYPE (MULTI): ICD-10-CM

## 2023-08-21 DIAGNOSIS — C14.0 THROAT CANCER (MULTI): ICD-10-CM

## 2023-08-21 DIAGNOSIS — L25.9 CONTACT DERMATITIS, UNSPECIFIED CONTACT DERMATITIS TYPE, UNSPECIFIED TRIGGER: ICD-10-CM

## 2023-08-21 DIAGNOSIS — E46 PROTEIN-CALORIE MALNUTRITION, UNSPECIFIED SEVERITY (MULTI): ICD-10-CM

## 2023-08-21 DIAGNOSIS — K21.9 GASTROESOPHAGEAL REFLUX DISEASE WITHOUT ESOPHAGITIS: ICD-10-CM

## 2023-08-21 PROCEDURE — 1126F AMNT PAIN NOTED NONE PRSNT: CPT | Performed by: STUDENT IN AN ORGANIZED HEALTH CARE EDUCATION/TRAINING PROGRAM

## 2023-08-21 PROCEDURE — 1160F RVW MEDS BY RX/DR IN RCRD: CPT | Performed by: STUDENT IN AN ORGANIZED HEALTH CARE EDUCATION/TRAINING PROGRAM

## 2023-08-21 PROCEDURE — 99214 OFFICE O/P EST MOD 30 MIN: CPT | Performed by: STUDENT IN AN ORGANIZED HEALTH CARE EDUCATION/TRAINING PROGRAM

## 2023-08-21 PROCEDURE — 1159F MED LIST DOCD IN RCRD: CPT | Performed by: STUDENT IN AN ORGANIZED HEALTH CARE EDUCATION/TRAINING PROGRAM

## 2023-08-21 RX ORDER — HYDROXYZINE HYDROCHLORIDE 25 MG/1
25 TABLET, FILM COATED ORAL NIGHTLY PRN
Qty: 30 TABLET | Refills: 1 | Status: SHIPPED | OUTPATIENT
Start: 2023-08-21 | End: 2024-01-09 | Stop reason: ALTCHOICE

## 2023-08-21 RX ORDER — TRETINOIN 0.5 MG/G
CREAM TOPICAL NIGHTLY
Qty: 45 G | Refills: 1 | Status: SHIPPED | OUTPATIENT
Start: 2023-08-21 | End: 2023-10-17

## 2023-08-21 RX ORDER — PANTOPRAZOLE SODIUM 20 MG/1
20 TABLET, DELAYED RELEASE ORAL DAILY
COMMUNITY
End: 2023-08-21 | Stop reason: ALTCHOICE

## 2023-08-21 RX ORDER — ATORVASTATIN CALCIUM 40 MG/1
40 TABLET, FILM COATED ORAL DAILY
Qty: 90 TABLET | Refills: 3 | Status: SHIPPED | OUTPATIENT
Start: 2023-08-21 | End: 2023-09-07 | Stop reason: SDUPTHER

## 2023-08-21 RX ORDER — TRIAMCINOLONE ACETONIDE 1 MG/G
CREAM TOPICAL 2 TIMES DAILY
Qty: 80 G | Refills: 1 | Status: SHIPPED | OUTPATIENT
Start: 2023-08-21

## 2023-08-21 RX ORDER — OMEPRAZOLE 20 MG/1
20 TABLET, DELAYED RELEASE ORAL
Qty: 90 TABLET | Refills: 1 | Status: SHIPPED | OUTPATIENT
Start: 2023-08-21 | End: 2024-01-09 | Stop reason: SDUPTHER

## 2023-08-22 ENCOUNTER — APPOINTMENT (OUTPATIENT)
Dept: PRIMARY CARE | Facility: CLINIC | Age: 68
End: 2023-08-22
Payer: MEDICARE

## 2023-08-22 ASSESSMENT — ENCOUNTER SYMPTOMS
FEVER: 0
DIZZINESS: 0
LIGHT-HEADEDNESS: 0
SHORTNESS OF BREATH: 0
VOMITING: 0
NAUSEA: 0

## 2023-08-22 NOTE — PROGRESS NOTES
Subjective   Diane Goetz is a 68 y.o. female who presents for Rash (Pt here today for rash to facial area since yesterday. Pt stated that there's soreness in face. Pt also concerned about thyroid, having hair loss.).  The patient is seen today for multiple concerns.    Over the past day she has had a rash on face, she states that it is painful, tender.  It resulted after having significant sun exposure and working outside in the sun.  She has previous history of poison ivy, 2 years prior.  No other marks anywhere else besides her face.    Other concerns include refills on her medications.    She is having persistent lesions on her feet and is interested in a referral for this.    Patient also like her thyroid rechecked as she feels that symptoms of hypothyroidism are recurring.    Rash  Pertinent negatives include no fever, shortness of breath or vomiting.       Review of Systems   Constitutional:  Negative for fever.   Respiratory:  Negative for shortness of breath.    Cardiovascular:  Negative for chest pain.   Gastrointestinal:  Negative for nausea and vomiting.   Skin:  Positive for rash.   Neurological:  Negative for dizziness and light-headedness.   All other systems reviewed and are negative.      Objective   Physical Exam  Vitals reviewed.   Constitutional:       General: She is not in acute distress.     Appearance: Normal appearance. She is not toxic-appearing.   HENT:      Head: Normocephalic and atraumatic.      Comments: Several tender patches bilaterally on face with erythematous eruptions, typically ranging around 1 to 2 cm     Nose: Nose normal.   Eyes:      Extraocular Movements: Extraocular movements intact.   Cardiovascular:      Rate and Rhythm: Normal rate and regular rhythm.      Heart sounds: No murmur heard.     No friction rub. No gallop.   Pulmonary:      Effort: Pulmonary effort is normal. No respiratory distress.      Breath sounds: Normal breath sounds. No wheezing, rhonchi or rales.    Skin:     General: Skin is warm and dry.   Neurological:      General: No focal deficit present.      Mental Status: She is alert.   Psychiatric:         Mood and Affect: Mood normal.         Behavior: Behavior normal.         Assessment/Plan   Problem List Items Addressed This Visit       Hypothyroid    Relevant Orders    TSH with reflex to Free T4 if abnormal    Throat cancer (CMS/HCC)    Plantar wart of both feet - Primary    Relevant Orders    Referral to Podiatry    Protein-calorie malnutrition, unspecified severity (CMS/HCC)     Other Visit Diagnoses       Contact dermatitis, unspecified contact dermatitis type, unspecified trigger        Relevant Medications    tretinoin (Retin-A) 0.05 % cream    triamcinolone (Kenalog) 0.1 % cream    Insomnia, unspecified type        Relevant Medications    hydrOXYzine HCL (Atarax) 25 mg tablet          Patient seen today for follow-up and rash on face.    Patient had previously had tretinoin cream which helped with similar rashes we will prescribe this as well as triamcinolone cream.    For her difficulty sleeping we will trial hydroxyzine, discontinue trazodone as there is no improvement with this in the past.    We will refill medications, will hold off on a thyroid refill until we get a repeat TSH level.    Follow-up as new concerns arise or for annual wellness visit

## 2023-08-23 ENCOUNTER — LAB (OUTPATIENT)
Dept: LAB | Facility: LAB | Age: 68
End: 2023-08-23
Payer: MEDICARE

## 2023-08-23 DIAGNOSIS — E03.9 HYPOTHYROIDISM, UNSPECIFIED TYPE: ICD-10-CM

## 2023-08-23 LAB
THYROTROPIN (MIU/L) IN SER/PLAS BY DETECTION LIMIT <= 0.05 MIU/L: 4.51 MIU/L (ref 0.44–3.98)
THYROXINE (T4) FREE (NG/DL) IN SER/PLAS: 0.81 NG/DL (ref 0.61–1.12)

## 2023-08-23 PROCEDURE — 36415 COLL VENOUS BLD VENIPUNCTURE: CPT

## 2023-08-23 PROCEDURE — 84439 ASSAY OF FREE THYROXINE: CPT

## 2023-08-23 PROCEDURE — 84443 ASSAY THYROID STIM HORMONE: CPT

## 2023-09-05 DIAGNOSIS — E03.9 HYPOTHYROIDISM, UNSPECIFIED TYPE: ICD-10-CM

## 2023-09-05 RX ORDER — LEVOTHYROXINE SODIUM 25 UG/1
TABLET ORAL
Qty: 90 TABLET | Refills: 0 | OUTPATIENT
Start: 2023-09-05

## 2023-09-07 ENCOUNTER — OFFICE VISIT (OUTPATIENT)
Dept: PRIMARY CARE | Facility: CLINIC | Age: 68
End: 2023-09-07
Payer: MEDICARE

## 2023-09-07 VITALS
TEMPERATURE: 98.1 F | BODY MASS INDEX: 21.03 KG/M2 | SYSTOLIC BLOOD PRESSURE: 100 MMHG | HEART RATE: 82 BPM | DIASTOLIC BLOOD PRESSURE: 64 MMHG | OXYGEN SATURATION: 93 % | RESPIRATION RATE: 16 BRPM | WEIGHT: 104.13 LBS

## 2023-09-07 DIAGNOSIS — R19.7 DIARRHEA, UNSPECIFIED TYPE: Primary | ICD-10-CM

## 2023-09-07 DIAGNOSIS — E03.9 HYPOTHYROIDISM, UNSPECIFIED TYPE: ICD-10-CM

## 2023-09-07 DIAGNOSIS — E78.5 HYPERLIPIDEMIA, MILD: ICD-10-CM

## 2023-09-07 PROCEDURE — 99214 OFFICE O/P EST MOD 30 MIN: CPT | Performed by: STUDENT IN AN ORGANIZED HEALTH CARE EDUCATION/TRAINING PROGRAM

## 2023-09-07 PROCEDURE — 1126F AMNT PAIN NOTED NONE PRSNT: CPT | Performed by: STUDENT IN AN ORGANIZED HEALTH CARE EDUCATION/TRAINING PROGRAM

## 2023-09-07 PROCEDURE — 1159F MED LIST DOCD IN RCRD: CPT | Performed by: STUDENT IN AN ORGANIZED HEALTH CARE EDUCATION/TRAINING PROGRAM

## 2023-09-07 PROCEDURE — 1160F RVW MEDS BY RX/DR IN RCRD: CPT | Performed by: STUDENT IN AN ORGANIZED HEALTH CARE EDUCATION/TRAINING PROGRAM

## 2023-09-07 RX ORDER — ATORVASTATIN CALCIUM 40 MG/1
40 TABLET, FILM COATED ORAL DAILY
Qty: 90 TABLET | Refills: 3 | Status: SHIPPED | OUTPATIENT
Start: 2023-09-07 | End: 2024-01-09 | Stop reason: SDUPTHER

## 2023-09-07 RX ORDER — LEVOTHYROXINE SODIUM 50 UG/1
50 TABLET ORAL DAILY
Qty: 30 TABLET | Refills: 0 | Status: SHIPPED | OUTPATIENT
Start: 2023-09-07 | End: 2023-10-02

## 2023-09-07 ASSESSMENT — ENCOUNTER SYMPTOMS
FEVER: 0
VOMITING: 0
LIGHT-HEADEDNESS: 0
SHORTNESS OF BREATH: 0
DIZZINESS: 0
NAUSEA: 0
DIARRHEA: 1

## 2023-09-08 ENCOUNTER — TELEPHONE (OUTPATIENT)
Dept: PRIMARY CARE | Facility: CLINIC | Age: 68
End: 2023-09-08
Payer: MEDICARE

## 2023-09-08 NOTE — PROGRESS NOTES
Subjective   Diane Goetz is a 68 y.o. female who presents for Rash and Diarrhea (Pt here today to F/U  for rash and Pt having diarrhea.).  Patient seen today for follow-up evaluation of lab work.  She is concerned that her thyroid levels are causing symptoms, fatigue, states she has a general uneasiness.  Denies any palpitations, no changes in terms of sweating or dry skin.  Has not noticed any changes with her hair.    Her TSH level is only elevated.  Normal free T4.    Patient states that she has had diarrhea over the past several days, progressive improvement.  States she noticed that after having mentos, something that she does not typically eat.    Rash  Associated symptoms include diarrhea. Pertinent negatives include no fever, shortness of breath or vomiting.   Diarrhea   Pertinent negatives include no fever or vomiting.       Review of Systems   Constitutional:  Negative for fever.   Respiratory:  Negative for shortness of breath.    Cardiovascular:  Negative for chest pain.   Gastrointestinal:  Positive for diarrhea. Negative for nausea and vomiting.   Skin:  Positive for rash.   Neurological:  Negative for dizziness and light-headedness.   All other systems reviewed and are negative.      Objective   Physical Exam  Vitals reviewed.   Constitutional:       General: She is not in acute distress.     Appearance: Normal appearance. She is not toxic-appearing.   HENT:      Head: Normocephalic and atraumatic.      Nose: Nose normal.   Eyes:      Extraocular Movements: Extraocular movements intact.   Cardiovascular:      Rate and Rhythm: Normal rate and regular rhythm.      Heart sounds: No murmur heard.     No friction rub. No gallop.   Pulmonary:      Effort: Pulmonary effort is normal. No respiratory distress.      Breath sounds: Normal breath sounds. No wheezing, rhonchi or rales.   Skin:     General: Skin is warm and dry.   Neurological:      General: No focal deficit present.      Mental Status: She is  alert.   Psychiatric:         Mood and Affect: Mood normal.         Behavior: Behavior normal.         Assessment/Plan   Problem List Items Addressed This Visit       Hyperlipidemia, mild    Relevant Medications    atorvastatin (Lipitor) 40 mg tablet    Hypothyroid    Relevant Medications    levothyroxine (Synthroid, Levoxyl) 50 mcg tablet    Other Relevant Orders    TSH with reflex to Free T4 if abnormal     Other Visit Diagnoses       Diarrhea, unspecified type    -  Primary    Relevant Medications    L. acidophilus/Bifid. animalis 10 billion cell tablet,chewable          We discussed several options in terms of patient's hypothyroidism.    We will refill patient's atorvastatin  We will recheck TSH in 6 weeks, prior to that we will increase for Synthroid from 37 to 50 mg.  Discussed concerns and worsening symptoms of uneasiness, weight loss.  Patient will look at the symptoms, will decrease back to previous dosing if there are any abnormalities.    In terms of patient's diarrhea we discussed staying away from sugar alcohols, artificial sugars and adding a probiotic for gut health.  Patient was agreeable.    Follow-up in 6 weeks with lab work or sooner if new concerns arise.

## 2023-09-25 ENCOUNTER — TELEPHONE (OUTPATIENT)
Dept: PRIMARY CARE | Facility: CLINIC | Age: 68
End: 2023-09-25
Payer: MEDICARE

## 2023-09-25 DIAGNOSIS — R05.1 ACUTE COUGH: ICD-10-CM

## 2023-09-25 RX ORDER — BENZONATATE 100 MG/1
100 CAPSULE ORAL 3 TIMES DAILY PRN
Qty: 30 CAPSULE | Refills: 0 | Status: SHIPPED | OUTPATIENT
Start: 2023-09-25 | End: 2023-11-20 | Stop reason: SDUPTHER

## 2023-10-01 DIAGNOSIS — E03.9 HYPOTHYROIDISM, UNSPECIFIED TYPE: ICD-10-CM

## 2023-10-02 RX ORDER — LEVOTHYROXINE SODIUM 50 UG/1
50 TABLET ORAL DAILY
Qty: 30 TABLET | Refills: 0 | Status: SHIPPED | OUTPATIENT
Start: 2023-10-02 | End: 2023-10-11 | Stop reason: SDUPTHER

## 2023-10-09 ENCOUNTER — OFFICE VISIT (OUTPATIENT)
Dept: PRIMARY CARE | Facility: CLINIC | Age: 68
End: 2023-10-09
Payer: MEDICARE

## 2023-10-09 VITALS
TEMPERATURE: 98 F | BODY MASS INDEX: 21.21 KG/M2 | WEIGHT: 105 LBS | RESPIRATION RATE: 16 BRPM | OXYGEN SATURATION: 96 % | SYSTOLIC BLOOD PRESSURE: 108 MMHG | DIASTOLIC BLOOD PRESSURE: 66 MMHG | HEART RATE: 84 BPM

## 2023-10-09 DIAGNOSIS — B07.9 VIRAL WARTS, UNSPECIFIED TYPE: Primary | ICD-10-CM

## 2023-10-09 DIAGNOSIS — T14.8XXA MUSCLE STRAIN: ICD-10-CM

## 2023-10-09 PROCEDURE — 99213 OFFICE O/P EST LOW 20 MIN: CPT

## 2023-10-09 PROCEDURE — 1159F MED LIST DOCD IN RCRD: CPT

## 2023-10-09 PROCEDURE — 1126F AMNT PAIN NOTED NONE PRSNT: CPT

## 2023-10-09 PROCEDURE — 1160F RVW MEDS BY RX/DR IN RCRD: CPT

## 2023-10-09 RX ORDER — MELOXICAM 15 MG/1
15 TABLET ORAL DAILY
Qty: 30 TABLET | Refills: 0 | Status: SHIPPED | OUTPATIENT
Start: 2023-10-09 | End: 2023-10-23

## 2023-10-09 RX ORDER — METHOCARBAMOL 750 MG/1
750 TABLET, FILM COATED ORAL 3 TIMES DAILY PRN
Qty: 21 TABLET | Refills: 0 | Status: SHIPPED | OUTPATIENT
Start: 2023-10-09 | End: 2024-01-09 | Stop reason: WASHOUT

## 2023-10-09 NOTE — PROGRESS NOTES
Subjective   Diane Goetz is a 68 y.o. female who presents for Hip Pain.  Patient presents pain near the right hip for nearly 1 week.  She denies any injuries or recent increase in physical activity level.  It for started in the middle of the day and she is not sure what brought it on.  She has never had anything quite like this before.  The pain is exacerbated by bending down and standing back up.  The pain feels sharp and can get up to an 8-9 out of 10 at its worst.  It happens a few times a day and can last for couple seconds or up to a minute.  The pain does not radiate elsewhere.  Ibuprofen helped some.  She denies abdominal pain, flank pain, back pain, dysuria, urinary frequency/urgency nausea, vomiting, constipation, saddle anesthesia, incontinence, leg weakness.  She does report occasional diarrhea which is not any worse recently.    Objective   /66 (BP Location: Right arm, Patient Position: Sitting, BP Cuff Size: Adult)   Pulse 84   Temp 36.7 °C (98 °F)   Resp 16   Wt 47.6 kg (105 lb)   SpO2 96%   BMI 21.21 kg/m²    PHYSICAL EXAM  Gen: Well appearing, in NAD  Eyes: EOMI  Lungs: No increased work of breathing, on RA  GI: Soft, NTND, no guarding or rebound  : No suprapubic or CVA tenderness  Extremities: WWP, cap refill <2sec, no pitting edema in LE b/l  MSK: There is tenderness to palpation of the inferior border of her lower ribs as well as the posterior superior iliac crest.  There is pain with extension of the hip.  There is no midline spinal tenderness.  There is no erythema, ecchymosis, swelling of the back.  Neuro: Alert, symmetrical facies, moves all extremities equally  Psych: Appropriate mood and affect    Assessment/Plan     Problem List Items Addressed This Visit       Viral warts - Primary     Patient has recurrent wart on her toe.  We will send to podiatry.         Relevant Orders    Referral to Podiatry    Muscle strain     Suspect iliopsoas muscle strain on the right.  Recommended  meloxicam, Robaxin, muscle stretch.  Follow-up as needed.         Relevant Medications    meloxicam (Mobic) 15 mg tablet    methocarbamol (Robaxin-750) 750 mg tablet      Kandi Polanco DO  Family Medicine Resident, PGY-3  [facility]  [address] [place] [address] [phone]

## 2023-10-09 NOTE — PROGRESS NOTES
I reviewed the resident/fellow's documentation and discussed the patient with the resident/fellow. I agree with the resident/fellow's medical decision making as documented in the note.   Patient has no chest pain or shortness of breath.  No nausea vomiting no numbness tingling.  She has concerns for back pain.  No kidney stone like simple blood in urine.  Tender to palpation.  Tight hip flexors.  Discomfort when stretching.  Appears that she strained her posterior muscle.  Exercises clean.  Patient referred any kidney stone like symptoms any chest pain shortness of breath any nausea vomiting diarrhea fever headache any concerning symptoms notify office or go to the ER  Follow-up in 2 weeks  Agree with assessment plan    Reuben Treadwell, DO

## 2023-10-09 NOTE — ASSESSMENT & PLAN NOTE
Suspect iliopsoas muscle strain on the right.  Recommended meloxicam, Robaxin, muscle stretch.  Follow-up as needed.

## 2023-10-10 ENCOUNTER — LAB (OUTPATIENT)
Dept: LAB | Facility: LAB | Age: 68
End: 2023-10-10
Payer: MEDICARE

## 2023-10-10 DIAGNOSIS — E03.9 HYPOTHYROIDISM, UNSPECIFIED TYPE: ICD-10-CM

## 2023-10-10 LAB
T4 FREE SERPL-MCNC: 1.02 NG/DL (ref 0.61–1.12)
TSH SERPL-ACNC: 4.33 MIU/L (ref 0.44–3.98)

## 2023-10-10 PROCEDURE — 84439 ASSAY OF FREE THYROXINE: CPT

## 2023-10-10 PROCEDURE — 36415 COLL VENOUS BLD VENIPUNCTURE: CPT

## 2023-10-10 PROCEDURE — 84443 ASSAY THYROID STIM HORMONE: CPT

## 2023-10-11 RX ORDER — LEVOTHYROXINE SODIUM 50 UG/1
50 TABLET ORAL DAILY
Qty: 90 TABLET | Refills: 1 | Status: SHIPPED | OUTPATIENT
Start: 2023-10-11 | End: 2024-01-09 | Stop reason: SDUPTHER

## 2023-10-14 DIAGNOSIS — L25.9 CONTACT DERMATITIS, UNSPECIFIED CONTACT DERMATITIS TYPE, UNSPECIFIED TRIGGER: ICD-10-CM

## 2023-10-17 RX ORDER — TRETINOIN 0.5 MG/G
CREAM TOPICAL
Qty: 45 G | Refills: 0 | Status: SHIPPED | OUTPATIENT
Start: 2023-10-17 | End: 2023-11-14 | Stop reason: SDUPTHER

## 2023-11-11 DIAGNOSIS — L25.9 CONTACT DERMATITIS, UNSPECIFIED CONTACT DERMATITIS TYPE, UNSPECIFIED TRIGGER: ICD-10-CM

## 2023-11-14 RX ORDER — TRETINOIN 0.5 MG/G
CREAM TOPICAL
Qty: 45 G | Refills: 0 | Status: SHIPPED | OUTPATIENT
Start: 2023-11-14

## 2023-11-20 ENCOUNTER — TELEMEDICINE (OUTPATIENT)
Dept: PRIMARY CARE | Facility: CLINIC | Age: 68
End: 2023-11-20
Payer: MEDICARE

## 2023-11-20 DIAGNOSIS — R05.1 ACUTE COUGH: ICD-10-CM

## 2023-11-20 DIAGNOSIS — J40 BRONCHITIS: Primary | ICD-10-CM

## 2023-11-20 PROCEDURE — 99213 OFFICE O/P EST LOW 20 MIN: CPT | Performed by: STUDENT IN AN ORGANIZED HEALTH CARE EDUCATION/TRAINING PROGRAM

## 2023-11-20 RX ORDER — AZITHROMYCIN 250 MG/1
TABLET, FILM COATED ORAL
Qty: 6 TABLET | Refills: 0 | Status: SHIPPED | OUTPATIENT
Start: 2023-11-20 | End: 2023-11-27 | Stop reason: WASHOUT

## 2023-11-20 RX ORDER — BENZONATATE 100 MG/1
100 CAPSULE ORAL 3 TIMES DAILY PRN
Qty: 30 CAPSULE | Refills: 0 | Status: SHIPPED | OUTPATIENT
Start: 2023-11-20 | End: 2024-01-09 | Stop reason: WASHOUT

## 2023-11-20 RX ORDER — PREDNISONE 50 MG/1
50 TABLET ORAL DAILY
Qty: 10 TABLET | Refills: 0 | Status: SHIPPED | OUTPATIENT
Start: 2023-11-20 | End: 2023-11-27 | Stop reason: ALTCHOICE

## 2023-11-20 ASSESSMENT — ENCOUNTER SYMPTOMS
DIZZINESS: 0
HEADACHES: 1
SHORTNESS OF BREATH: 0
SORE THROAT: 1
LIGHT-HEADEDNESS: 0
VOMITING: 0
COUGH: 1
FEVER: 0
NAUSEA: 0

## 2023-11-20 NOTE — PROGRESS NOTES
Subjective   Diane Goetz is a 68 y.o. female who presents for Earache, Sore Throat, and Nasal Congestion (Pt to do virtual visit today for the following symptoms).  Earache   Associated symptoms include coughing, headaches and a sore throat. Pertinent negatives include no vomiting.   Sore Throat   This is a new problem. The current episode started in the past 7 days. The problem has been unchanged. Neither side of throat is experiencing more pain than the other. There has been no fever. The pain is at a severity of 0/10. Associated symptoms include congestion, coughing and headaches. Pertinent negatives include no ear pain, shortness of breath or vomiting. She has had no exposure to strep or mono. She has tried acetaminophen for the symptoms.       Review of Systems   Constitutional:  Negative for fever.   HENT:  Positive for congestion and sore throat. Negative for ear pain.    Respiratory:  Positive for cough. Negative for shortness of breath.    Cardiovascular:  Negative for chest pain.   Gastrointestinal:  Negative for nausea and vomiting.   Neurological:  Positive for headaches. Negative for dizziness and light-headedness.   All other systems reviewed and are negative.      Objective   Physical Exam  Constitutional:       Comments: Seen via virtual exam           Assessment/Plan   Problem List Items Addressed This Visit       Acute cough    Relevant Medications    benzonatate (Tessalon) 100 mg capsule     Other Visit Diagnoses       Bronchitis    -  Primary    Relevant Medications    azithromycin (Zithromax) 250 mg tablet    predniSONE (Deltasone) 50 mg tablet          Patient seen today for persistent cough, cold symptoms.    Significant history of COPD, we will treat with azithromycin, prednisone.  Rx Tessalon Perles for symptomatic relief.    Follow-up in 1 to 2 weeks if symptoms persist or worsen, consider chest x-ray at that time.

## 2023-11-27 ENCOUNTER — OFFICE VISIT (OUTPATIENT)
Dept: PRIMARY CARE | Facility: CLINIC | Age: 68
End: 2023-11-27
Payer: MEDICARE

## 2023-11-27 VITALS
SYSTOLIC BLOOD PRESSURE: 99 MMHG | BODY MASS INDEX: 21.41 KG/M2 | RESPIRATION RATE: 18 BRPM | OXYGEN SATURATION: 97 % | WEIGHT: 106 LBS | HEART RATE: 98 BPM | DIASTOLIC BLOOD PRESSURE: 59 MMHG | TEMPERATURE: 98.1 F

## 2023-11-27 DIAGNOSIS — J01.10 ACUTE NON-RECURRENT FRONTAL SINUSITIS: Primary | ICD-10-CM

## 2023-11-27 DIAGNOSIS — J11.1 INFLUENZA: ICD-10-CM

## 2023-11-27 PROCEDURE — 1160F RVW MEDS BY RX/DR IN RCRD: CPT | Performed by: STUDENT IN AN ORGANIZED HEALTH CARE EDUCATION/TRAINING PROGRAM

## 2023-11-27 PROCEDURE — 1126F AMNT PAIN NOTED NONE PRSNT: CPT | Performed by: STUDENT IN AN ORGANIZED HEALTH CARE EDUCATION/TRAINING PROGRAM

## 2023-11-27 PROCEDURE — 1159F MED LIST DOCD IN RCRD: CPT | Performed by: STUDENT IN AN ORGANIZED HEALTH CARE EDUCATION/TRAINING PROGRAM

## 2023-11-27 PROCEDURE — 99214 OFFICE O/P EST MOD 30 MIN: CPT | Performed by: STUDENT IN AN ORGANIZED HEALTH CARE EDUCATION/TRAINING PROGRAM

## 2023-11-27 RX ORDER — BROMPHENIRAMINE MALEATE, PSEUDOEPHEDRINE HYDROCHLORIDE, AND DEXTROMETHORPHAN HYDROBROMIDE 2; 30; 10 MG/5ML; MG/5ML; MG/5ML
SYRUP ORAL
COMMUNITY
Start: 2023-11-24 | End: 2023-11-27 | Stop reason: ALTCHOICE

## 2023-11-27 RX ORDER — PROMETHAZINE HYDROCHLORIDE AND CODEINE PHOSPHATE 6.25; 1 MG/5ML; MG/5ML
5 SOLUTION ORAL EVERY 6 HOURS PRN
Qty: 118 ML | Refills: 0 | Status: SHIPPED | OUTPATIENT
Start: 2023-11-27 | End: 2023-12-04

## 2023-11-27 RX ORDER — AMOXICILLIN AND CLAVULANATE POTASSIUM 875; 125 MG/1; MG/1
1 TABLET, FILM COATED ORAL 2 TIMES DAILY
Qty: 14 TABLET | Refills: 0 | Status: SHIPPED | OUTPATIENT
Start: 2023-11-27 | End: 2023-12-04

## 2023-11-27 RX ORDER — PREDNISONE 10 MG/1
TABLET ORAL
Qty: 32 TABLET | Refills: 0 | Status: SHIPPED | OUTPATIENT
Start: 2023-11-27 | End: 2023-12-11

## 2023-11-28 ENCOUNTER — TELEPHONE (OUTPATIENT)
Dept: PRIMARY CARE | Facility: CLINIC | Age: 68
End: 2023-11-28
Payer: MEDICARE

## 2023-11-28 DIAGNOSIS — R05.1 ACUTE COUGH: Primary | ICD-10-CM

## 2023-11-28 RX ORDER — HYDROCODONE BITARTRATE AND HOMATROPINE METHYLBROMIDE ORAL SOLUTION 5; 1.5 MG/5ML; MG/5ML
5 LIQUID ORAL EVERY 6 HOURS PRN
Qty: 100 ML | Refills: 0 | Status: SHIPPED | OUTPATIENT
Start: 2023-11-28 | End: 2023-12-03

## 2023-11-29 ASSESSMENT — ENCOUNTER SYMPTOMS
NAUSEA: 0
FEVER: 0
DIZZINESS: 0
LIGHT-HEADEDNESS: 0
VOMITING: 0
SHORTNESS OF BREATH: 0

## 2023-11-30 NOTE — PROGRESS NOTES
Subjective   Diane Goetz is a 68 y.o. female who presents for Earache (Ear pain, sore throat, runny nose X 10days.).  Patient seen today for persistent URI symptoms.  Patient was seen 1 week ago, prescribed azithromycin, no significant improvement with this.  Patient is intermittently lost her voice and was diagnosed with influenza, her  tested positive as well.  She is currently on day 8, states that symptoms have persisted.  She did have some improvement with steroid in the past, improvement with wheezing associated.    Patient states that her were symptoms cough, keeping her up at night, unable to sleep or rest with this.    She is tolerating fluids well, eating and drinking at baseline.    Earache   Pertinent negatives include no vomiting.       Review of Systems   Constitutional:  Negative for fever.   HENT:  Positive for ear pain.    Respiratory:  Negative for shortness of breath.    Cardiovascular:  Negative for chest pain.   Gastrointestinal:  Negative for nausea and vomiting.   Neurological:  Negative for dizziness and light-headedness.   All other systems reviewed and are negative.      Objective   Physical Exam  Vitals reviewed.   Constitutional:       General: She is not in acute distress.     Appearance: Normal appearance. She is not toxic-appearing.   HENT:      Head: Normocephalic and atraumatic.      Nose: Nose normal.   Eyes:      Extraocular Movements: Extraocular movements intact.   Cardiovascular:      Rate and Rhythm: Normal rate and regular rhythm.      Heart sounds: No murmur heard.     No friction rub. No gallop.   Pulmonary:      Effort: Pulmonary effort is normal. No respiratory distress.      Breath sounds: Normal breath sounds. No wheezing, rhonchi or rales.   Skin:     General: Skin is warm and dry.   Neurological:      General: No focal deficit present.      Mental Status: She is alert.   Psychiatric:         Mood and Affect: Mood normal.         Behavior: Behavior normal.          Assessment/Plan   Problem List Items Addressed This Visit    None  Visit Diagnoses       Acute non-recurrent frontal sinusitis    -  Primary    Relevant Medications    predniSONE (Deltasone) 10 mg tablet    amoxicillin-pot clavulanate (Augmentin) 875-125 mg tablet    promethazine-codeine (Phenergan W/Codeine) 6.25-10 mg/5 mL syrup    Influenza        Relevant Medications    predniSONE (Deltasone) 10 mg tablet    promethazine-codeine (Phenergan W/Codeine) 6.25-10 mg/5 mL syrup          Patient seen today for persistent URI, symptoms after influenza.  Rx prescription strength cough medicine  Rx Augmentin  Continue with prednisone taper    Follow-up as new concerns arise

## 2024-01-09 ENCOUNTER — LAB (OUTPATIENT)
Dept: LAB | Facility: LAB | Age: 69
End: 2024-01-09
Payer: COMMERCIAL

## 2024-01-09 ENCOUNTER — OFFICE VISIT (OUTPATIENT)
Dept: PRIMARY CARE | Facility: CLINIC | Age: 69
End: 2024-01-09
Payer: COMMERCIAL

## 2024-01-09 VITALS
OXYGEN SATURATION: 98 % | TEMPERATURE: 98.1 F | HEART RATE: 92 BPM | DIASTOLIC BLOOD PRESSURE: 68 MMHG | HEIGHT: 59 IN | SYSTOLIC BLOOD PRESSURE: 111 MMHG | RESPIRATION RATE: 18 BRPM | BODY MASS INDEX: 21.57 KG/M2 | WEIGHT: 107 LBS

## 2024-01-09 DIAGNOSIS — G62.9 NEUROPATHY: ICD-10-CM

## 2024-01-09 DIAGNOSIS — R19.7 DIARRHEA, UNSPECIFIED TYPE: ICD-10-CM

## 2024-01-09 DIAGNOSIS — J44.9 CHRONIC OBSTRUCTIVE PULMONARY DISEASE, UNSPECIFIED COPD TYPE (MULTI): ICD-10-CM

## 2024-01-09 DIAGNOSIS — E78.5 HYPERLIPIDEMIA, MILD: ICD-10-CM

## 2024-01-09 DIAGNOSIS — C14.0 THROAT CANCER (MULTI): ICD-10-CM

## 2024-01-09 DIAGNOSIS — I73.9 PERIPHERAL VASCULAR DISEASE OF FOOT (CMS-HCC): Primary | ICD-10-CM

## 2024-01-09 DIAGNOSIS — R05.1 ACUTE COUGH: ICD-10-CM

## 2024-01-09 DIAGNOSIS — E46 PROTEIN-CALORIE MALNUTRITION, UNSPECIFIED SEVERITY (MULTI): ICD-10-CM

## 2024-01-09 DIAGNOSIS — E03.9 HYPOTHYROIDISM, UNSPECIFIED TYPE: ICD-10-CM

## 2024-01-09 DIAGNOSIS — R06.2 WHEEZING: ICD-10-CM

## 2024-01-09 DIAGNOSIS — K21.9 GASTROESOPHAGEAL REFLUX DISEASE WITHOUT ESOPHAGITIS: ICD-10-CM

## 2024-01-09 PROCEDURE — 36415 COLL VENOUS BLD VENIPUNCTURE: CPT

## 2024-01-09 PROCEDURE — 1159F MED LIST DOCD IN RCRD: CPT | Performed by: STUDENT IN AN ORGANIZED HEALTH CARE EDUCATION/TRAINING PROGRAM

## 2024-01-09 PROCEDURE — 99214 OFFICE O/P EST MOD 30 MIN: CPT | Performed by: STUDENT IN AN ORGANIZED HEALTH CARE EDUCATION/TRAINING PROGRAM

## 2024-01-09 PROCEDURE — 1160F RVW MEDS BY RX/DR IN RCRD: CPT | Performed by: STUDENT IN AN ORGANIZED HEALTH CARE EDUCATION/TRAINING PROGRAM

## 2024-01-09 PROCEDURE — 1126F AMNT PAIN NOTED NONE PRSNT: CPT | Performed by: STUDENT IN AN ORGANIZED HEALTH CARE EDUCATION/TRAINING PROGRAM

## 2024-01-09 PROCEDURE — 86003 ALLG SPEC IGE CRUDE XTRC EA: CPT

## 2024-01-09 RX ORDER — LEVOTHYROXINE SODIUM 50 UG/1
50 TABLET ORAL DAILY
Qty: 90 TABLET | Refills: 1 | Status: SHIPPED | OUTPATIENT
Start: 2024-01-09 | End: 2024-06-05 | Stop reason: SDUPTHER

## 2024-01-09 RX ORDER — ACETAMINOPHEN 325 MG/1
650 TABLET ORAL EVERY 6 HOURS PRN
Qty: 90 TABLET | Refills: 3 | Status: SHIPPED | OUTPATIENT
Start: 2024-01-09

## 2024-01-09 RX ORDER — OMEPRAZOLE 20 MG/1
20 TABLET, DELAYED RELEASE ORAL
Qty: 90 TABLET | Refills: 1 | Status: SHIPPED | OUTPATIENT
Start: 2024-01-09 | End: 2024-02-09 | Stop reason: SDUPTHER

## 2024-01-09 RX ORDER — ALBUTEROL SULFATE 90 UG/1
2 AEROSOL, METERED RESPIRATORY (INHALATION) EVERY 4 HOURS PRN
Qty: 18 G | Refills: 11 | Status: SHIPPED | OUTPATIENT
Start: 2024-01-09 | End: 2024-03-29

## 2024-01-09 RX ORDER — GABAPENTIN 300 MG/1
300 CAPSULE ORAL 2 TIMES DAILY
Qty: 180 CAPSULE | Refills: 3 | Status: SHIPPED | OUTPATIENT
Start: 2024-01-09 | End: 2024-06-05 | Stop reason: SDUPTHER

## 2024-01-09 RX ORDER — FLUTICASONE PROPIONATE 50 MCG
1 SPRAY, SUSPENSION (ML) NASAL DAILY
Qty: 16 G | Refills: 11 | Status: SHIPPED | OUTPATIENT
Start: 2024-01-09 | End: 2025-01-08

## 2024-01-09 RX ORDER — ATORVASTATIN CALCIUM 40 MG/1
40 TABLET, FILM COATED ORAL DAILY
Qty: 90 TABLET | Refills: 3 | Status: SHIPPED | OUTPATIENT
Start: 2024-01-09 | End: 2025-01-08

## 2024-01-09 NOTE — PROGRESS NOTES
Subjective   Diane Goetz is a 68 y.o. female who presents for Diarrhea (She has diarrhea last week for 3 days. She thinks it is dependant on what she eats. She has had diarrhea  1 more time since that episode.).  Diarrhea   This is a recurrent problem. The current episode started in the past 7 days. The problem occurs less than 2 times per day. The problem has been waxing and waning. The patient states that diarrhea does not awaken her from sleep. Associated symptoms include increased flatus. Pertinent negatives include no abdominal pain, chills, coughing, fever, headaches, myalgias, URI or vomiting. There are no known risk factors. She has tried increased fluids, change of diet and analgesics for the symptoms. The treatment provided mild relief. There is no history of bowel resection, inflammatory bowel disease, malabsorption or a recent abdominal surgery.       Review of Systems   Constitutional:  Negative for chills and fever.   Respiratory:  Negative for cough and shortness of breath.    Cardiovascular:  Negative for chest pain.   Gastrointestinal:  Positive for diarrhea and flatus. Negative for abdominal pain, nausea and vomiting.   Musculoskeletal:  Negative for myalgias.   Neurological:  Negative for dizziness, light-headedness and headaches.   All other systems reviewed and are negative.      Objective   Physical Exam  Vitals reviewed.   Constitutional:       General: She is not in acute distress.     Appearance: Normal appearance. She is not toxic-appearing.   HENT:      Head: Normocephalic and atraumatic.      Nose: Nose normal.   Eyes:      Extraocular Movements: Extraocular movements intact.   Cardiovascular:      Rate and Rhythm: Normal rate and regular rhythm.      Heart sounds: No murmur heard.     No friction rub. No gallop.   Pulmonary:      Effort: Pulmonary effort is normal. No respiratory distress.      Breath sounds: Normal breath sounds. No wheezing, rhonchi or rales.   Skin:     General: Skin  is warm and dry.   Neurological:      General: No focal deficit present.      Mental Status: She is alert.   Psychiatric:         Mood and Affect: Mood normal.         Behavior: Behavior normal.         Assessment/Plan   Problem List Items Addressed This Visit       COPD (chronic obstructive pulmonary disease) (CMS/[facility])    Relevant Medications    fluticasone-umeclidin-vilanter (TRELEGY-ELLIPTA) 100-62.5-25 mcg blister with device    Hyperlipidemia, mild    Relevant Medications    atorvastatin (Lipitor) 40 mg tablet    Hypothyroid    Relevant Medications    levothyroxine (Synthroid, Levoxyl) 50 mcg tablet    Neuropathy    Relevant Medications    gabapentin (Neurontin) 300 mg capsule    Throat cancer (CMS/[facility])    Acute cough    Relevant Medications    fluticasone (Flonase) 50 mcg/actuation nasal spray    albuterol 90 mcg/actuation inhaler    acetaminophen (Tylenol) 325 mg tablet    Protein-calorie malnutrition, unspecified severity (CMS/[facility])    Peripheral vascular disease of foot (CMS/[facility]) - Primary     Other Visit Diagnoses       Gastroesophageal reflux disease without esophagitis        Relevant Medications    omeprazole OTC (PriLOSEC OTC) 20 mg EC tablet    Diarrhea, unspecified type        Relevant Medications    L. acidophilus/Bifid. animalis 10 billion cell tablet,chewable    Other Relevant Orders    Food Allergy Profile IgE (Completed)    Wheezing        Relevant Orders    Food Allergy Profile IgE (Completed)          Patient seen today for chronic concerns as well and has diarrhea.  We will get an allergy panel to see if there are any foods that could be causing her symptoms.    Will refill all medications.    Discussed taking Imodium for symptomatic relief, discussed importance of hydration, fiber and healthy eating natural fruits and vegetables to improve this.  Patient was agreeable.

## 2024-01-10 LAB
CLAM IGE QN: <0.1 KU/L
CODFISH IGE QN: <0.1 KU/L
CORN IGE QN: <0.1
EGG WHITE IGE QN: <0.1 KU/L
MILK IGE QN: <0.1 KU/L
PEANUT IGE QN: <0.1 KU/L
SCALLOP IGE QN: <0.1 KU/L
SESAME SEED IGE QN: <0.1 KU/L
SHRIMP IGE QN: <0.1 KU/L
SOYBEAN IGE QN: <0.1 KU/L
WALNUT IGE QN: <0.1 KU/L
WHEAT IGE QN: <0.1 KU/L

## 2024-01-11 DIAGNOSIS — R05.1 ACUTE COUGH: ICD-10-CM

## 2024-01-12 RX ORDER — BENZONATATE 100 MG/1
CAPSULE ORAL
Qty: 30 CAPSULE | Refills: 0 | Status: SHIPPED | OUTPATIENT
Start: 2024-01-12 | End: 2024-04-18 | Stop reason: SDUPTHER

## 2024-01-12 ASSESSMENT — ENCOUNTER SYMPTOMS
DIZZINESS: 0
CHILLS: 0
COUGH: 0
MYALGIAS: 0
HEADACHES: 0
NAUSEA: 0
ABDOMINAL PAIN: 0
FEVER: 0
FLATUS: 1
SHORTNESS OF BREATH: 0
DIARRHEA: 1
LIGHT-HEADEDNESS: 0
VOMITING: 0

## 2024-01-29 ENCOUNTER — TELEMEDICINE (OUTPATIENT)
Dept: PRIMARY CARE | Facility: CLINIC | Age: 69
End: 2024-01-29
Payer: COMMERCIAL

## 2024-01-29 DIAGNOSIS — J06.9 VIRAL URI WITH COUGH: Primary | ICD-10-CM

## 2024-01-29 PROCEDURE — 99213 OFFICE O/P EST LOW 20 MIN: CPT

## 2024-01-29 RX ORDER — BENZONATATE 100 MG/1
100 CAPSULE ORAL 3 TIMES DAILY PRN
Qty: 21 CAPSULE | Refills: 0 | Status: SHIPPED | OUTPATIENT
Start: 2024-01-29 | End: 2024-02-28

## 2024-01-29 ASSESSMENT — ENCOUNTER SYMPTOMS
DIZZINESS: 0
FEVER: 0
SHORTNESS OF BREATH: 0
NAUSEA: 0
COUGH: 1
VOMITING: 0
LIGHT-HEADEDNESS: 0
HEADACHES: 1

## 2024-01-29 NOTE — PROGRESS NOTES
I reviewed the resident/fellow's documentation and discussed the patient with the resident. I agree with the resident medical decision making as documented in the note.   Patient has URI-like symptoms.  Patient discussed both during and after her virtual visit.  Patient if she is able to should come in to get a COVID and flu swab.  She told the resident she will get her COVID at home.  She has low-grade fever URI-like symptoms.  No chest pain or shortness of breath no nausea vomiting diarrhea no rash, if patient starts developing any she is to go to the ER  Follow up 1 week  Agree with A&P  Reuben Treadwell, DO

## 2024-01-29 NOTE — PROGRESS NOTES
"Subjective   Diane Goetz is a 68 y.o. female who presents for Cough (Cough, congestion, sore throat, chills and LG fever. Symptoms started last night.).  Pt presenting for examination of chills, coughing, congestion. She is coughing up phlegm. Nobody else in the house is sick and she does not know if anybody at work is sick. She did not get her COVID or flu shot this year. She did take tylenol so far which does not help. She does endorse waking up at four in the morning with a headache, she describes it as \"feeling like she was drinking\" but she has not been. She did not test herself for COVID yet but she does have tests at home. She did use her albuterol inhaler and does not think it helped too much. She does have COPD and has been using her and albuterol as prescribed. She does have Flonase which she will start using. She tested positive for the flu 1-2 mos ago. She does have a pulse ox which was at 94%. She will continue to check it.     Cough  Associated symptoms include headaches. Pertinent negatives include no chest pain, fever or shortness of breath.       Review of Systems   Constitutional:  Negative for fever.   HENT:  Positive for congestion.    Respiratory:  Positive for cough. Negative for shortness of breath.    Cardiovascular:  Negative for chest pain.   Gastrointestinal:  Negative for nausea and vomiting.   Neurological:  Positive for headaches. Negative for dizziness and light-headedness.   All other systems reviewed and are negative.      Objective   Physical Exam  Constitutional:       Comments: Virtual so limited PE         Assessment/Plan   Problem List Items Addressed This Visit             ICD-10-CM    Viral URI with cough - Primary J06.9     Pt presenting for examination of viral URI with cough that started last night. This started last night and is accompanied with headaches, congestion, and chills. Pt has been given Flonase and tessalon for that by her PCP Dr. Howe. She is to continue taking " those and is to begin with mucinex DM. She can take tylenol as needed as well. Pt to follow up in 7-10 days if symptoms continue to worsen or fail to improve. Pt to continue taking pulse ox at home and was given ER precautions.         Relevant Medications    dextromethorphan-guaifenesin (Mucinex DM)  mg 12 hr tablet    benzonatate (Tessalon) 100 mg capsule

## 2024-01-29 NOTE — ASSESSMENT & PLAN NOTE
Pt presenting for examination of viral URI with cough that started last night. This started last night and is accompanied with headaches, congestion, and chills. Pt has been given Flonase and tessalon for that by her PCP Dr. Howe. She is to continue taking those and is to begin with mucinex DM. She can take tylenol as needed as well. Pt to follow up in 7-10 days if symptoms continue to worsen or fail to improve. Pt to continue taking pulse ox at home and was given ER precautions.

## 2024-01-31 ENCOUNTER — TELEPHONE (OUTPATIENT)
Dept: PRIMARY CARE | Facility: CLINIC | Age: 69
End: 2024-01-31
Payer: COMMERCIAL

## 2024-01-31 DIAGNOSIS — Z12.31 ENCOUNTER FOR SCREENING MAMMOGRAM FOR MALIGNANT NEOPLASM OF BREAST: ICD-10-CM

## 2024-01-31 DIAGNOSIS — Z87.891 PERSONAL HISTORY OF NICOTINE DEPENDENCE: ICD-10-CM

## 2024-01-31 DIAGNOSIS — F17.200 SMOKER: Primary | ICD-10-CM

## 2024-02-09 DIAGNOSIS — K21.9 GASTROESOPHAGEAL REFLUX DISEASE WITHOUT ESOPHAGITIS: ICD-10-CM

## 2024-02-09 RX ORDER — OMEPRAZOLE 20 MG/1
20 TABLET, DELAYED RELEASE ORAL
Qty: 90 TABLET | Refills: 1 | Status: SHIPPED | OUTPATIENT
Start: 2024-02-09 | End: 2024-04-18 | Stop reason: SDUPTHER

## 2024-03-29 ENCOUNTER — TELEPHONE (OUTPATIENT)
Dept: PRIMARY CARE | Facility: CLINIC | Age: 69
End: 2024-03-29
Payer: COMMERCIAL

## 2024-03-29 DIAGNOSIS — R06.2 WHEEZING: Primary | ICD-10-CM

## 2024-03-29 RX ORDER — ALBUTEROL SULFATE 90 UG/1
2 AEROSOL, METERED RESPIRATORY (INHALATION) EVERY 4 HOURS PRN
Qty: 8 G | Refills: 11 | Status: SHIPPED | OUTPATIENT
Start: 2024-03-29 | End: 2025-03-29

## 2024-04-04 ENCOUNTER — HOSPITAL ENCOUNTER (OUTPATIENT)
Dept: RADIOLOGY | Facility: CLINIC | Age: 69
Discharge: HOME | End: 2024-04-04
Payer: COMMERCIAL

## 2024-04-04 ENCOUNTER — LAB (OUTPATIENT)
Dept: LAB | Facility: LAB | Age: 69
End: 2024-04-04
Payer: COMMERCIAL

## 2024-04-04 ENCOUNTER — OFFICE VISIT (OUTPATIENT)
Dept: PRIMARY CARE | Facility: CLINIC | Age: 69
End: 2024-04-04
Payer: COMMERCIAL

## 2024-04-04 VITALS
HEART RATE: 94 BPM | TEMPERATURE: 98.2 F | RESPIRATION RATE: 20 BRPM | OXYGEN SATURATION: 95 % | WEIGHT: 108 LBS | BODY MASS INDEX: 21.81 KG/M2 | SYSTOLIC BLOOD PRESSURE: 124 MMHG | DIASTOLIC BLOOD PRESSURE: 65 MMHG

## 2024-04-04 DIAGNOSIS — M54.2 NECK PAIN: ICD-10-CM

## 2024-04-04 DIAGNOSIS — E03.9 HYPOTHYROIDISM, UNSPECIFIED TYPE: ICD-10-CM

## 2024-04-04 DIAGNOSIS — Z72.0 TOBACCO USE: Primary | ICD-10-CM

## 2024-04-04 DIAGNOSIS — R23.2 FLUSHING: ICD-10-CM

## 2024-04-04 LAB
BASOPHILS # BLD AUTO: 0.08 X10*3/UL (ref 0–0.1)
BASOPHILS NFR BLD AUTO: 0.8 %
CREAT SERPL-MCNC: 0.66 MG/DL (ref 0.5–1.05)
EGFRCR SERPLBLD CKD-EPI 2021: >90 ML/MIN/1.73M*2
EOSINOPHIL # BLD AUTO: 0.14 X10*3/UL (ref 0–0.7)
EOSINOPHIL NFR BLD AUTO: 1.3 %
ERYTHROCYTE [DISTWIDTH] IN BLOOD BY AUTOMATED COUNT: 15.1 % (ref 11.5–14.5)
ERYTHROCYTE [SEDIMENTATION RATE] IN BLOOD BY WESTERGREN METHOD: 29 MM/H (ref 0–30)
HCT VFR BLD AUTO: 45 % (ref 36–46)
HGB BLD-MCNC: 14 G/DL (ref 12–16)
IMM GRANULOCYTES # BLD AUTO: 0.04 X10*3/UL (ref 0–0.7)
IMM GRANULOCYTES NFR BLD AUTO: 0.4 % (ref 0–0.9)
LYMPHOCYTES # BLD AUTO: 2.18 X10*3/UL (ref 1.2–4.8)
LYMPHOCYTES NFR BLD AUTO: 20.5 %
MCH RBC QN AUTO: 26.6 PG (ref 26–34)
MCHC RBC AUTO-ENTMCNC: 31.1 G/DL (ref 32–36)
MCV RBC AUTO: 85 FL (ref 80–100)
MONOCYTES # BLD AUTO: 0.75 X10*3/UL (ref 0.1–1)
MONOCYTES NFR BLD AUTO: 7 %
NEUTROPHILS # BLD AUTO: 7.45 X10*3/UL (ref 1.2–7.7)
NEUTROPHILS NFR BLD AUTO: 70 %
NRBC BLD-RTO: 0 /100 WBCS (ref 0–0)
PLATELET # BLD AUTO: 326 X10*3/UL (ref 150–450)
RBC # BLD AUTO: 5.27 X10*6/UL (ref 4–5.2)
TSH SERPL-ACNC: 2.96 MIU/L (ref 0.44–3.98)
WBC # BLD AUTO: 10.6 X10*3/UL (ref 4.4–11.3)

## 2024-04-04 PROCEDURE — 85652 RBC SED RATE AUTOMATED: CPT

## 2024-04-04 PROCEDURE — 82565 ASSAY OF CREATININE: CPT

## 2024-04-04 PROCEDURE — 36415 COLL VENOUS BLD VENIPUNCTURE: CPT

## 2024-04-04 PROCEDURE — 72040 X-RAY EXAM NECK SPINE 2-3 VW: CPT

## 2024-04-04 PROCEDURE — 85025 COMPLETE CBC W/AUTO DIFF WBC: CPT

## 2024-04-04 PROCEDURE — 1160F RVW MEDS BY RX/DR IN RCRD: CPT

## 2024-04-04 PROCEDURE — 84443 ASSAY THYROID STIM HORMONE: CPT

## 2024-04-04 PROCEDURE — 1159F MED LIST DOCD IN RCRD: CPT

## 2024-04-04 PROCEDURE — 99214 OFFICE O/P EST MOD 30 MIN: CPT

## 2024-04-04 PROCEDURE — 72040 X-RAY EXAM NECK SPINE 2-3 VW: CPT | Performed by: RADIOLOGY

## 2024-04-04 RX ORDER — VARENICLINE TARTRATE 0.5 MG/1
TABLET, FILM COATED ORAL
Qty: 11 TABLET | Refills: 0 | Status: SHIPPED | OUTPATIENT
Start: 2024-04-04 | End: 2024-06-05 | Stop reason: SINTOL

## 2024-04-04 RX ORDER — VARENICLINE TARTRATE 1 MG/1
1 TABLET, FILM COATED ORAL 2 TIMES DAILY
Qty: 240 TABLET | Refills: 0 | Status: SHIPPED | OUTPATIENT
Start: 2024-04-04 | End: 2024-08-02

## 2024-04-04 NOTE — PROGRESS NOTES
Subjective   Patient ID: Diane Goetz is a 68 y.o. female who presents for Earache (Left side ear and neck pain x 3 weeks).    Patient here today with ear/neck symptoms.  Sleeps on right side and states that symptoms started on that side.  Difficult formally describing symptoms.  States that they are to be bothersome; not painful. Tingling/tickling in sensation but denies paresthesias. Symptoms last for several minutes.  Been ongoing for the last 3 weeks. Occurs sporadically throughout the day.  Denies any weakness, no fever, no difficulty swallowing.  Has recently been treated for COVID along with multiple sinus infections, requiring antibiotics.  Persistent every day smoker at this time, is open to quitting.           Review of Systems    Objective   /65 (BP Location: Right arm, Patient Position: Sitting)   Pulse 94   Temp 36.8 °C (98.2 °F)   Resp 20   Wt 49 kg (108 lb)   SpO2 95%   BMI 21.81 kg/m²     Physical Exam  Constitutional:       General: She is not in acute distress.  Cardiovascular:      Rate and Rhythm: Normal rate and regular rhythm.      Pulses: Normal pulses.      Heart sounds: No murmur heard.     No gallop.   Pulmonary:      Effort: Pulmonary effort is normal. No respiratory distress.      Breath sounds: No wheezing, rhonchi or rales.   Musculoskeletal:      Cervical back: Normal range of motion. No rigidity or tenderness.   Lymphadenopathy:      Cervical: No cervical adenopathy.   Skin:     Comments: Blanching erythema and noted across bilateral upper extremity digits.  No discernible rash.  Pulses normal and symmetric.   Neurological:      General: No focal deficit present.         Assessment/Plan   Problem List Items Addressed This Visit             ICD-10-CM    Hypothyroid E03.9     Despite known hypothyroidism with concurrent neck pain will assess TSH.         Relevant Orders    Tsh With Reflex To Free T4 If Abnormal (Completed)    Neck pain M54.2     Etiology for neck pain remains  elusive  -Will obtain radiograph at this time to assess for any bony abnormality that could be contributory.  -Given patient's cancer history will additionally order soft tissue to CT.         Relevant Orders    XR cervical spine 2-3 views (Completed)    CBC and Auto Differential (Completed)    Sedimentation Rate (Completed)    Tsh With Reflex To Free T4 If Abnormal (Completed)    CT soft tissue neck w IV contrast    Creatinine, Serum (Completed)    Flushing R23.2     -Given concurrent neck symptoms plus hand flexion will assess for any rheumatological conditions with inflammatory markers.  -No pulse abnormalities.         Relevant Orders    Sedimentation Rate (Completed)    Tsh With Reflex To Free T4 If Abnormal (Completed)    Tobacco use - Primary Z72.0     -Will start Chantix at this time for tobacco cessation  -Discussed with patient proper utilization of this medication and timing of quitting.         Relevant Medications    varenicline (Chantix) 0.5 mg tablet    varenicline (Chantix) 1 mg tablet   Recommend patient follow-up after completion of neck CT or sooner pending upon laboratory evaluations.

## 2024-04-09 PROBLEM — R23.2 FLUSHING: Status: ACTIVE | Noted: 2024-04-09

## 2024-04-09 PROBLEM — Z72.0 TOBACCO USE: Status: ACTIVE | Noted: 2024-04-09

## 2024-04-09 PROBLEM — M54.2 NECK PAIN: Status: ACTIVE | Noted: 2024-04-09

## 2024-04-09 NOTE — ASSESSMENT & PLAN NOTE
-Given concurrent neck symptoms plus hand flexion will assess for any rheumatological conditions with inflammatory markers.  -No pulse abnormalities.

## 2024-04-09 NOTE — ASSESSMENT & PLAN NOTE
Etiology for neck pain remains elusive  -Will obtain radiograph at this time to assess for any bony abnormality that could be contributory.  -Given patient's cancer history will additionally order soft tissue to CT.

## 2024-04-09 NOTE — ASSESSMENT & PLAN NOTE
-Will start Chantix at this time for tobacco cessation  -Discussed with patient proper utilization of this medication and timing of quitting.

## 2024-04-12 NOTE — PROGRESS NOTES
I reviewed the resident/fellow's documentation and discussed the patient with the resident/fellow. I agree with the resident/fellow's medical decision making as documented in the note.     Mary Salas MD

## 2024-04-18 ENCOUNTER — OFFICE VISIT (OUTPATIENT)
Dept: PRIMARY CARE | Facility: CLINIC | Age: 69
End: 2024-04-18
Payer: COMMERCIAL

## 2024-04-18 VITALS
HEART RATE: 84 BPM | DIASTOLIC BLOOD PRESSURE: 82 MMHG | SYSTOLIC BLOOD PRESSURE: 126 MMHG | BODY MASS INDEX: 22.42 KG/M2 | WEIGHT: 111 LBS | TEMPERATURE: 98.1 F | RESPIRATION RATE: 16 BRPM | OXYGEN SATURATION: 95 %

## 2024-04-18 DIAGNOSIS — R05.1 ACUTE COUGH: ICD-10-CM

## 2024-04-18 DIAGNOSIS — R19.7 DIARRHEA, UNSPECIFIED TYPE: ICD-10-CM

## 2024-04-18 DIAGNOSIS — J44.9 CHRONIC OBSTRUCTIVE PULMONARY DISEASE, UNSPECIFIED COPD TYPE (MULTI): ICD-10-CM

## 2024-04-18 DIAGNOSIS — K21.9 GASTROESOPHAGEAL REFLUX DISEASE WITHOUT ESOPHAGITIS: ICD-10-CM

## 2024-04-18 DIAGNOSIS — M54.2 NECK PAIN: Primary | ICD-10-CM

## 2024-04-18 PROCEDURE — 1159F MED LIST DOCD IN RCRD: CPT

## 2024-04-18 PROCEDURE — 1160F RVW MEDS BY RX/DR IN RCRD: CPT

## 2024-04-18 PROCEDURE — 99214 OFFICE O/P EST MOD 30 MIN: CPT

## 2024-04-18 RX ORDER — OMEPRAZOLE 20 MG/1
20 TABLET, DELAYED RELEASE ORAL
Qty: 90 TABLET | Refills: 1 | Status: SHIPPED | OUTPATIENT
Start: 2024-04-18 | End: 2024-06-05 | Stop reason: SDUPTHER

## 2024-04-18 RX ORDER — BENZONATATE 100 MG/1
100 CAPSULE ORAL 3 TIMES DAILY PRN
Qty: 30 CAPSULE | Refills: 0 | Status: SHIPPED | OUTPATIENT
Start: 2024-04-18 | End: 2024-05-18

## 2024-04-18 NOTE — PROGRESS NOTES
Subjective   Patient ID: Diane Goetz is a 68 y.o. female who presents for Follow-up.    Patient here today to follow up regarding neuropathic symptoms placed around the head/neck.  Symptoms are slightly worsened at this time.  Area of tingling expanded to include top of the head. Tingling/tickling in sensation.  Otherwise unchanged since last evaluation. Has scheduled CT of the area of concern 1 week from today.  Patiently having CT completed  Additionally has been having diarrhea, for the past 2 days. No associated abdominal complaints; solely loose stool.   No fever recently, no nausea/vomiting.  States that she has several contacts at work with similar symptoms concurrently.         Review of Systems    Objective   /82 (BP Location: Right arm, Patient Position: Sitting, BP Cuff Size: Adult)   Pulse 84   Temp 36.7 °C (98.1 °F)   Resp 16   Wt 50.3 kg (111 lb)   SpO2 95%   BMI 22.42 kg/m²     Physical Exam  Neck:      Comments: Palpable hypertonicity notable of bilateral SCM.  Patient does have restriction in range of motion particularly with rotation and sidebending of the cervical spine.  Cardiovascular:      Rate and Rhythm: Normal rate and regular rhythm.      Heart sounds: No murmur heard.     No friction rub. No gallop.   Pulmonary:      Effort: Pulmonary effort is normal. No respiratory distress.      Breath sounds: No wheezing, rhonchi or rales.   Musculoskeletal:      Cervical back: No rigidity or tenderness.   Lymphadenopathy:      Cervical: No cervical adenopathy.   Neurological:      General: No focal deficit present.      Cranial Nerves: No cranial nerve deficit.      Motor: No weakness.         Assessment/Plan   Problem List Items Addressed This Visit             ICD-10-CM    Acute cough R05.1    Relevant Medications    benzonatate (Tessalon) 100 mg capsule    COPD (chronic obstructive pulmonary disease) (Multi) J44.9     -Patient taking systemic steroids intermittently approximately one 10  mg tablet per week.  -Given frailty will attempt to avoid systemic steroids and increase patient's controller inhaler.  Trelegy dose increased to 200 mg of fluticasone.  Provided with written prescription.  -Contact the office should symptoms fail to be improved with this medication change.         Relevant Medications    fluticasone-umeclidin-vilanter (TRELEGY-ELLIPTA) 100-62.5-25 mcg blister with device    Neck pain - Primary M54.2     -Etiology remains unclear at this time suspect neuropathy secondary to muscular compression.  Performed OMT: Suboccipital release given dermatomal nature to the patient's pain along C2/C3.  Patient tolerated well  -Will assess for radiographic pathology upon completion of CT scan.          Other Visit Diagnoses         Codes    Gastroesophageal reflux disease without esophagitis     K21.9    Relevant Medications    omeprazole OTC (PriLOSEC OTC) 20 mg EC tablet    Diarrhea, unspecified type     R19.7    Suspect viral gastroenteritis, treating conservatively with Imodium/Pepto-Bismol as needed.  Low concern for dehydration.  Return to office if persistent

## 2024-04-19 ENCOUNTER — HOSPITAL ENCOUNTER (OUTPATIENT)
Dept: RADIOLOGY | Facility: CLINIC | Age: 69
Discharge: HOME | End: 2024-04-19
Payer: COMMERCIAL

## 2024-04-19 VITALS — HEIGHT: 59 IN | WEIGHT: 110.89 LBS | BODY MASS INDEX: 22.36 KG/M2

## 2024-04-19 DIAGNOSIS — Z12.31 ENCOUNTER FOR SCREENING MAMMOGRAM FOR MALIGNANT NEOPLASM OF BREAST: ICD-10-CM

## 2024-04-19 PROCEDURE — 77063 BREAST TOMOSYNTHESIS BI: CPT | Performed by: RADIOLOGY

## 2024-04-19 PROCEDURE — 77067 SCR MAMMO BI INCL CAD: CPT | Performed by: RADIOLOGY

## 2024-04-19 PROCEDURE — 77067 SCR MAMMO BI INCL CAD: CPT

## 2024-04-19 NOTE — ASSESSMENT & PLAN NOTE
-Patient taking systemic steroids intermittently approximately one 10 mg tablet per week.  -Given frailty will attempt to avoid systemic steroids and increase patient's controller inhaler.  Trelegy dose increased to 200 mg of fluticasone.  Provided with written prescription.  -Contact the office should symptoms fail to be improved with this medication change.

## 2024-04-19 NOTE — ASSESSMENT & PLAN NOTE
-Etiology remains unclear at this time suspect neuropathy secondary to muscular compression.  Performed OMT: Suboccipital release given dermatomal nature to the patient's pain along C2/C3.  Patient tolerated well  -Will assess for radiographic pathology upon completion of CT scan.

## 2024-04-22 ENCOUNTER — TELEPHONE (OUTPATIENT)
Dept: PRIMARY CARE | Facility: CLINIC | Age: 69
End: 2024-04-22

## 2024-04-22 DIAGNOSIS — E78.5 HYPERLIPIDEMIA, MILD: Primary | ICD-10-CM

## 2024-04-25 ENCOUNTER — HOSPITAL ENCOUNTER (OUTPATIENT)
Dept: RADIOLOGY | Facility: CLINIC | Age: 69
Discharge: HOME | End: 2024-04-25
Payer: COMMERCIAL

## 2024-04-25 DIAGNOSIS — M54.2 NECK PAIN: ICD-10-CM

## 2024-04-25 DIAGNOSIS — F17.200 SMOKER: ICD-10-CM

## 2024-04-25 DIAGNOSIS — Z87.891 PERSONAL HISTORY OF NICOTINE DEPENDENCE: ICD-10-CM

## 2024-04-25 PROCEDURE — 71271 CT THORAX LUNG CANCER SCR C-: CPT

## 2024-04-25 PROCEDURE — 70491 CT SOFT TISSUE NECK W/DYE: CPT | Performed by: RADIOLOGY

## 2024-04-25 PROCEDURE — 2550000001 HC RX 255 CONTRASTS

## 2024-04-25 PROCEDURE — 70491 CT SOFT TISSUE NECK W/DYE: CPT

## 2024-04-25 RX ADMIN — IOHEXOL 70 ML: 350 INJECTION, SOLUTION INTRAVENOUS at 12:14

## 2024-06-05 ENCOUNTER — OFFICE VISIT (OUTPATIENT)
Dept: PRIMARY CARE | Facility: CLINIC | Age: 69
End: 2024-06-05
Payer: COMMERCIAL

## 2024-06-05 VITALS
TEMPERATURE: 98.1 F | HEART RATE: 72 BPM | BODY MASS INDEX: 22.41 KG/M2 | RESPIRATION RATE: 16 BRPM | DIASTOLIC BLOOD PRESSURE: 67 MMHG | SYSTOLIC BLOOD PRESSURE: 114 MMHG | WEIGHT: 111 LBS | OXYGEN SATURATION: 94 %

## 2024-06-05 DIAGNOSIS — Z00.00 HEALTHCARE MAINTENANCE: ICD-10-CM

## 2024-06-05 DIAGNOSIS — Z78.0 MENOPAUSE PRESENT: ICD-10-CM

## 2024-06-05 DIAGNOSIS — Z13.820 SCREENING FOR OSTEOPOROSIS: ICD-10-CM

## 2024-06-05 DIAGNOSIS — Z87.09 HISTORY OF BRONCHITIS: Primary | ICD-10-CM

## 2024-06-05 DIAGNOSIS — G62.9 NEUROPATHY: ICD-10-CM

## 2024-06-05 DIAGNOSIS — K21.9 GASTROESOPHAGEAL REFLUX DISEASE WITHOUT ESOPHAGITIS: ICD-10-CM

## 2024-06-05 DIAGNOSIS — Z72.0 TOBACCO USE: ICD-10-CM

## 2024-06-05 DIAGNOSIS — J44.9 CHRONIC OBSTRUCTIVE PULMONARY DISEASE, UNSPECIFIED COPD TYPE (MULTI): ICD-10-CM

## 2024-06-05 DIAGNOSIS — E03.9 HYPOTHYROIDISM, UNSPECIFIED TYPE: ICD-10-CM

## 2024-06-05 PROCEDURE — 99213 OFFICE O/P EST LOW 20 MIN: CPT

## 2024-06-05 PROCEDURE — 1159F MED LIST DOCD IN RCRD: CPT

## 2024-06-05 RX ORDER — IBUPROFEN 200 MG
1 TABLET ORAL EVERY 24 HOURS
Qty: 30 PATCH | Refills: 0 | Status: SHIPPED | OUTPATIENT
Start: 2024-06-05 | End: 2024-07-05

## 2024-06-05 RX ORDER — GABAPENTIN 300 MG/1
300 CAPSULE ORAL 2 TIMES DAILY
Qty: 180 CAPSULE | Refills: 3 | Status: CANCELLED | OUTPATIENT
Start: 2024-06-05 | End: 2025-06-05

## 2024-06-05 RX ORDER — OMEPRAZOLE 20 MG/1
20 TABLET, DELAYED RELEASE ORAL
Qty: 90 TABLET | Refills: 1 | Status: SHIPPED | OUTPATIENT
Start: 2024-06-05 | End: 2024-12-02

## 2024-06-05 RX ORDER — AZITHROMYCIN 250 MG/1
TABLET, FILM COATED ORAL DAILY
Qty: 6 TABLET | Refills: 0 | Status: SHIPPED | OUTPATIENT
Start: 2024-06-05 | End: 2024-06-10

## 2024-06-05 RX ORDER — LEVOTHYROXINE SODIUM 50 UG/1
50 TABLET ORAL DAILY
Qty: 90 TABLET | Refills: 1 | Status: SHIPPED | OUTPATIENT
Start: 2024-06-05

## 2024-06-05 RX ORDER — GABAPENTIN 300 MG/1
300 CAPSULE ORAL 2 TIMES DAILY
Qty: 180 CAPSULE | Refills: 3 | Status: SHIPPED | OUTPATIENT
Start: 2024-06-05 | End: 2025-06-05

## 2024-06-05 NOTE — PROGRESS NOTES
Subjective   Patient ID: Diane Goetz is a 68 y.o. female who presents for Med Refill and Nicotine Dependence (Pt here today to F/U for medication to stop smoking and refills).    Patient here today for follow up regarding COPD.  Regularly follows with pulmonologist Dr. Lane.  Was started on Trelegy Ellipta 200 mcg at last visit.  States that her breathing has been improved.  Has since seen her pulmonologist who additionally placed patient on montelukast.  No recent COPD exacerbations.  Stable on current treatment.  Additionally is requesting refills for omeprazole, Synthroid, gabapentin.  Neuropathic symptoms are stable at this time.  Patient is currently stable with GERD symptoms, utilizes omeprazole daily.  TSH was checked at last visit and was within normal limits.  Denies any acute worsening of reflux symptoms.  No new onset cold intolerance, weight gain or worsening fatigue.  Neuropathic symptoms within neck are stable.  Denies any acute worsening of neuropathy symptoms.  Additionally is requesting other modalities for tobacco cessation.  Has previously had good success with nicotine patches and would like to try again.         Review of Systems    Objective   /67 (BP Location: Right arm, Patient Position: Sitting)   Pulse 72   Temp 36.7 °C (98.1 °F) (Temporal)   Resp 16   Wt 50.3 kg (111 lb)   SpO2 94%   BMI 22.41 kg/m²     Physical Exam  Constitutional:       General: She is not in acute distress.     Appearance: Normal appearance. She is not ill-appearing.   Cardiovascular:      Rate and Rhythm: Normal rate and regular rhythm.      Heart sounds: No murmur heard.     No friction rub. No gallop.   Pulmonary:      Effort: Pulmonary effort is normal. No respiratory distress.      Breath sounds: Normal breath sounds. No wheezing, rhonchi or rales.      Comments: Prolonged expiratory phase.  Neurological:      General: No focal deficit present.      Mental Status: She is alert and oriented to person,  place, and time.   Psychiatric:         Mood and Affect: Mood normal.         Behavior: Behavior normal.         Assessment/Plan   Problem List Items Addressed This Visit             ICD-10-CM    Neuropathy G62.9    Relevant Medications    gabapentin (Neurontin) 300 mg capsule    Hypothyroid E03.9    Relevant Medications    levothyroxine (Synthroid, Levoxyl) 50 mcg tablet    Tobacco use Z72.0    Relevant Medications    nicotine (Nicoderm CQ) 14 mg/24 hr patch     Other Visit Diagnoses         Codes    History of bronchitis    -  Primary Z87.09    Relevant Medications    azithromycin (Zithromax) 250 mg tablet    Gastroesophageal reflux disease without esophagitis     K21.9    Relevant Medications    omeprazole OTC (PriLOSEC OTC) 20 mg EC tablet    Healthcare maintenance     Z00.00    Screening for osteoporosis     Z13.820    Menopause present     Z78.0    Relevant Orders    XR DEXA bone density        Will attempt nicotine replacement therapy with topical patches at this time.  Recommend patient abstain from smoking while using these patches to prevent any complication.  Refills provided for gabapentin, is stable on current treatment therapy.  Refill provided for patient's levothyroxine, most recent TSH within normal limits, 1 month prior to today's visit.  Additionally refill provided for patient's omeprazole, consider trialing off first transitioning to H2 antagonist given patient's risk for osteoporosis  Regarding osteoporosis will DEXA scan to assess for osteoporotic features or findings.  Given patient's frailty: BMI, smoking status, parity is at risk for osteoporosis.  Additionally patient has history of bronchitis therefore will provide patient with prescription of short-term azithromycin for upcoming travel across country.

## 2024-06-05 NOTE — ASSESSMENT & PLAN NOTE
-COPD is stable at this time.  Continue with current treatment plan: Trelegy Ellipta, as well as montelukast per pulmonology  -Patient regularly follows with pulmonologist.

## 2024-06-06 NOTE — ADDENDUM NOTE
Addended by: BONNY BUENO on: 6/6/2024 06:51 AM     Modules accepted: Level of Service     Controlled, continue  To watch his diet, check a lipid profile

## 2024-06-06 NOTE — PROGRESS NOTES
I reviewed the resident/fellow's documentation and discussed the patient with the resident/fellow. I agree with the resident/fellow's medical decision making as documented in the note.     COPD - follows with pulmonology  Nicotine addiction - agree with trial of nicotine patches.  Do not smoke while using nicotine patches.    GERD - on PPI therapy.   I agree that she should try to switch to an H2 blocker such as famotidine 20 mg BID due to potential long term use complications with PPI therapy.   At risk of osteoporosis due to smoking hx, age, BMI - obtain dexa  Follow up with patient in 1 month     Gabriel Quintanilla DO

## 2024-06-10 ENCOUNTER — APPOINTMENT (OUTPATIENT)
Dept: PRIMARY CARE | Facility: CLINIC | Age: 69
End: 2024-06-10
Payer: COMMERCIAL

## 2024-08-13 ENCOUNTER — TELEPHONE (OUTPATIENT)
Dept: PRIMARY CARE | Facility: CLINIC | Age: 69
End: 2024-08-13
Payer: COMMERCIAL

## 2024-08-16 ENCOUNTER — TELEMEDICINE (OUTPATIENT)
Dept: PRIMARY CARE | Facility: CLINIC | Age: 69
End: 2024-08-16
Payer: COMMERCIAL

## 2024-08-16 ENCOUNTER — TELEPHONE (OUTPATIENT)
Dept: PRIMARY CARE | Facility: CLINIC | Age: 69
End: 2024-08-16

## 2024-08-16 DIAGNOSIS — U07.1 COVID: Primary | ICD-10-CM

## 2024-08-16 DIAGNOSIS — K13.79 MOUTH SORES: Primary | ICD-10-CM

## 2024-08-17 NOTE — PROGRESS NOTES
Patient called after being diagnosed with COVID 6 days prior, has significant mouth sores and is asking for medical relief for this.    Rx Magic mouthwash, sent to 2 pharmacies for

## 2024-09-16 ENCOUNTER — TELEPHONE (OUTPATIENT)
Dept: PRIMARY CARE | Facility: CLINIC | Age: 69
End: 2024-09-16

## 2024-09-16 ENCOUNTER — OFFICE VISIT (OUTPATIENT)
Dept: PRIMARY CARE | Facility: CLINIC | Age: 69
End: 2024-09-16
Payer: COMMERCIAL

## 2024-09-16 VITALS
HEIGHT: 59 IN | RESPIRATION RATE: 16 BRPM | DIASTOLIC BLOOD PRESSURE: 72 MMHG | TEMPERATURE: 98.2 F | SYSTOLIC BLOOD PRESSURE: 116 MMHG | BODY MASS INDEX: 21.37 KG/M2 | OXYGEN SATURATION: 100 % | HEART RATE: 82 BPM | WEIGHT: 106 LBS

## 2024-09-16 DIAGNOSIS — F51.01 PRIMARY INSOMNIA: Primary | ICD-10-CM

## 2024-09-16 DIAGNOSIS — J44.1 COPD EXACERBATION (MULTI): ICD-10-CM

## 2024-09-16 DIAGNOSIS — R06.2 WHEEZING: ICD-10-CM

## 2024-09-16 PROCEDURE — 1159F MED LIST DOCD IN RCRD: CPT

## 2024-09-16 PROCEDURE — 99214 OFFICE O/P EST MOD 30 MIN: CPT

## 2024-09-16 PROCEDURE — 3008F BODY MASS INDEX DOCD: CPT

## 2024-09-16 RX ORDER — MELATONIN 3 MG
5 CAPSULE ORAL DAILY
Qty: 30 CAPSULE | Refills: 0 | Status: SHIPPED | OUTPATIENT
Start: 2024-09-16 | End: 2024-10-16

## 2024-09-16 RX ORDER — ALBUTEROL SULFATE 90 UG/1
2 INHALANT RESPIRATORY (INHALATION) EVERY 4 HOURS PRN
Qty: 8 G | Refills: 11 | Status: SHIPPED | OUTPATIENT
Start: 2024-09-16 | End: 2025-09-16

## 2024-09-16 RX ORDER — PREDNISONE 20 MG/1
40 TABLET ORAL DAILY
Qty: 10 TABLET | Refills: 0 | Status: SHIPPED | OUTPATIENT
Start: 2024-09-16 | End: 2024-09-21

## 2024-09-16 RX ORDER — AZITHROMYCIN 250 MG/1
TABLET, FILM COATED ORAL
Qty: 6 TABLET | Refills: 0 | Status: SHIPPED | OUTPATIENT
Start: 2024-09-16 | End: 2024-09-21

## 2024-09-16 RX ORDER — ACETAMINOPHEN, DIPHENHYDRAMINE HCL, PHENYLEPHRINE HCL 325; 25; 5 MG/1; MG/1; MG/1
10 TABLET ORAL DAILY
Qty: 30 TABLET | Refills: 0 | Status: SHIPPED | OUTPATIENT
Start: 2024-09-16 | End: 2024-10-16

## 2024-09-16 ASSESSMENT — ENCOUNTER SYMPTOMS
CHEST TIGHTNESS: 0
CONSTIPATION: 0
EYE DISCHARGE: 0
APPETITE CHANGE: 0
ABDOMINAL DISTENTION: 0
SINUS PRESSURE: 0
VOICE CHANGE: 1
NUMBNESS: 0
HEADACHES: 0
VOMITING: 0
EYE REDNESS: 0
EYE ITCHING: 0
DYSURIA: 0
STRIDOR: 0
FATIGUE: 0
SEIZURES: 0
DIARRHEA: 0
HEMATURIA: 0
COUGH: 1
DIZZINESS: 0
NAUSEA: 0
PALPITATIONS: 0
NECK STIFFNESS: 0
ABDOMINAL PAIN: 0
TREMORS: 0
ARTHRALGIAS: 0
CHILLS: 1
EYE PAIN: 0
NECK PAIN: 0
FEVER: 0
WHEEZING: 1
ACTIVITY CHANGE: 0
FACIAL SWELLING: 0
SHORTNESS OF BREATH: 0
MYALGIAS: 0
SINUS PAIN: 0
UNEXPECTED WEIGHT CHANGE: 0
BACK PAIN: 0
SORE THROAT: 1
FREQUENCY: 0
RHINORRHEA: 0
LIGHT-HEADEDNESS: 0
WOUND: 0

## 2024-09-16 NOTE — TELEPHONE ENCOUNTER
[facility] calling to have you resend a RX or call to clarify.  Melatonin dose says 3mg but sig says take 5mg.   Please clarify  [facility] # [phone]

## 2024-09-16 NOTE — PATIENT INSTRUCTIONS
Diane,   Sorry you are not feeling well.     Please take medications as prescribed.     If sore throat is not improving, please see ENT for a scope of your throat. Keep doing saline rinses.     Try melatonin for sleep at night. Take this 1 hour before going to sleep.

## 2024-09-16 NOTE — PROGRESS NOTES
Subjective   Patient ID: [id number]   Diane Goetz is a 69 y.o. female who presents for Sore Throat.  Patient report sore throat for the past 4 days along with increased cough and sputum production.  She does have a history of laryngeal cancer s/p radiation and COPD.  She currently takes Trelegy inhaler and was prescribed Singulair by her pulmonologist although she stopped this due to concerns for insomnia.  She also reports chills but denies fever, nasal congestion, headache, vomiting/diarrhea, or any other sick symptoms.  She has been taking Tessalon Perles at home and doing saline rinses without improvement.  She also has noticed that her voice sounds different which has her concerned about possible cancer recurrence.  She show takes albuterol as needed but recently ran out of this as well.      Review of Systems   Constitutional:  Positive for chills. Negative for activity change, appetite change, fatigue, fever and unexpected weight change.   HENT:  Positive for sore throat and voice change. Negative for dental problem, ear pain, facial swelling, rhinorrhea, sinus pressure, sinus pain, sneezing and tinnitus.    Eyes:  Negative for pain, discharge, redness and itching.   Respiratory:  Positive for cough and wheezing. Negative for chest tightness, shortness of breath and stridor.    Cardiovascular:  Negative for chest pain, palpitations and leg swelling.   Gastrointestinal:  Negative for abdominal distention, abdominal pain, constipation, diarrhea, nausea and vomiting.   Genitourinary:  Negative for decreased urine volume, dysuria, enuresis, frequency, hematuria and urgency.   Musculoskeletal:  Negative for arthralgias, back pain, myalgias, neck pain and neck stiffness.   Skin:  Negative for pallor, rash and wound.   Neurological:  Negative for dizziness, tremors, seizures, light-headedness, numbness and headaches.       Objective   /72 (BP Location: Left arm, Patient Position: Sitting, BP Cuff Size:  "Adult)   Pulse 82   Temp 36.8 °C (98.2 °F)   Resp 16   Ht 1.499 m (4' 11\")   Wt 48.1 kg (106 lb)   SpO2 100%   BMI 21.41 kg/m²    Physical Exam  Vitals and nursing note reviewed.   Constitutional:       General: She is not in acute distress.     Appearance: Normal appearance. She is not ill-appearing or toxic-appearing.   HENT:      Head: Normocephalic and atraumatic.      Comments: Hoarse voice      Right Ear: Tympanic membrane, ear canal and external ear normal.      Left Ear: Tympanic membrane, ear canal and external ear normal.      Nose: Nose normal.      Mouth/Throat:      Mouth: Mucous membranes are moist.      Pharynx: Oropharynx is clear. Uvula midline.      Tonsils: No tonsillar exudate or tonsillar abscesses.   Eyes:      Extraocular Movements: Extraocular movements intact.   Cardiovascular:      Rate and Rhythm: Normal rate and regular rhythm.   Pulmonary:      Effort: Pulmonary effort is normal. No respiratory distress.      Breath sounds: Normal breath sounds. Decreased air movement present. No stridor. No wheezing or rhonchi.      Comments: Speaking in full sentences on room air without dyspnea  Abdominal:      General: Abdomen is flat.      Tenderness: There is no abdominal tenderness. There is no guarding or rebound.   Musculoskeletal:         General: Normal range of motion.   Skin:     General: Skin is warm and dry.      Findings: No rash.   Neurological:      General: No focal deficit present.      Mental Status: She is alert and oriented to person, place, and time.         Assessment/Plan   Problem List Items Addressed This Visit    None  Visit Diagnoses       Primary insomnia    -  Primary    Relevant Medications    melatonin 3 mg capsule    Wheezing        Relevant Medications    albuterol (Ventolin HFA) 90 mcg/actuation inhaler    COPD exacerbation (Multi)        Relevant Medications    predniSONE (Deltasone) 20 mg tablet    azithromycin (Zithromax) 250 mg tablet          Given " increased cough and sputum production, poor air movement on auscultation, we will treat this as mild COPD exacerbation likely secondary to viral upper respiratory infection.  No evidence of pneumonia or strep throat.  Vital signs within normal limits, no respiratory distress.  Encouraged patient to resume Singulair as prescribed by pulmonology, will prescribe melatonin for insomnia.  Patient is to follow-up in 1 week if not improving.  Also discussed the importance of seeing ENT if her voice does not go back to normal given her history of laryngeal cancer.     Discussed with attending physician Dr. Treadwell.       Matti Gerardo DO

## 2024-09-16 NOTE — PROGRESS NOTES
I reviewed the resident/fellow's documentation and discussed the patient with the resident. I agree with the resident medical decision making as documented in the note.   Patient had COVID roughly a month ago.  Patient still having some runny nose cough and congestion.  No fever no chills no chest pain or shortness of breath.  Does appear to be a possible COPD exacerbation.  She will be treated accordingly.  She did not have any chest pain shortness of breath any nausea vomiting or fever in case her symptoms go to ER  Follow-up in 1 week  Side effects medication Splane to the patient  Agree with assessment and plan  Reuben Treadwell, DO

## 2024-10-09 ENCOUNTER — HOSPITAL ENCOUNTER (OUTPATIENT)
Dept: RADIOLOGY | Facility: CLINIC | Age: 69
Discharge: HOME | End: 2024-10-09
Payer: COMMERCIAL

## 2024-10-09 DIAGNOSIS — F17.210 NICOTINE DEPENDENCE, CIGARETTES, UNCOMPLICATED: ICD-10-CM

## 2024-10-09 PROCEDURE — 71250 CT THORAX DX C-: CPT | Performed by: RADIOLOGY

## 2024-10-09 PROCEDURE — 71250 CT THORAX DX C-: CPT

## 2024-10-11 DIAGNOSIS — F51.01 PRIMARY INSOMNIA: ICD-10-CM

## 2024-10-11 RX ORDER — MELATONIN 10 MG
10 CAPSULE ORAL DAILY
Qty: 90 CAPSULE | Refills: 1 | Status: SHIPPED | OUTPATIENT
Start: 2024-10-11

## 2024-10-17 ENCOUNTER — TELEPHONE (OUTPATIENT)
Dept: PRIMARY CARE | Facility: CLINIC | Age: 69
End: 2024-10-17
Payer: COMMERCIAL

## 2024-10-23 ENCOUNTER — APPOINTMENT (OUTPATIENT)
Dept: PRIMARY CARE | Facility: CLINIC | Age: 69
End: 2024-10-23
Payer: COMMERCIAL

## 2024-10-23 ENCOUNTER — HOSPITAL ENCOUNTER (OUTPATIENT)
Dept: RADIOLOGY | Facility: CLINIC | Age: 69
Discharge: HOME | End: 2024-10-23
Payer: COMMERCIAL

## 2024-10-23 ENCOUNTER — LAB (OUTPATIENT)
Dept: LAB | Facility: LAB | Age: 69
End: 2024-10-23
Payer: COMMERCIAL

## 2024-10-23 VITALS
HEART RATE: 79 BPM | OXYGEN SATURATION: 94 % | HEIGHT: 59 IN | DIASTOLIC BLOOD PRESSURE: 53 MMHG | WEIGHT: 108 LBS | TEMPERATURE: 97.5 F | SYSTOLIC BLOOD PRESSURE: 103 MMHG | BODY MASS INDEX: 21.77 KG/M2 | RESPIRATION RATE: 16 BRPM

## 2024-10-23 DIAGNOSIS — Z13.820 OSTEOPOROSIS SCREENING: ICD-10-CM

## 2024-10-23 DIAGNOSIS — Z13.820 ENCOUNTER FOR OSTEOPOROSIS SCREENING IN ASYMPTOMATIC POSTMENOPAUSAL PATIENT: ICD-10-CM

## 2024-10-23 DIAGNOSIS — Z78.0 ENCOUNTER FOR OSTEOPOROSIS SCREENING IN ASYMPTOMATIC POSTMENOPAUSAL PATIENT: ICD-10-CM

## 2024-10-23 DIAGNOSIS — J44.9 CHRONIC OBSTRUCTIVE PULMONARY DISEASE, UNSPECIFIED COPD TYPE (MULTI): ICD-10-CM

## 2024-10-23 DIAGNOSIS — E78.5 HYPERLIPIDEMIA, MILD: ICD-10-CM

## 2024-10-23 DIAGNOSIS — E03.9 HYPOTHYROIDISM, UNSPECIFIED TYPE: Primary | ICD-10-CM

## 2024-10-23 DIAGNOSIS — F17.200 TOBACCO USE DISORDER: ICD-10-CM

## 2024-10-23 DIAGNOSIS — E03.9 HYPOTHYROIDISM, UNSPECIFIED TYPE: ICD-10-CM

## 2024-10-23 DIAGNOSIS — Z00.00 ROUTINE GENERAL MEDICAL EXAMINATION AT HEALTH CARE FACILITY: ICD-10-CM

## 2024-10-23 LAB
ALBUMIN SERPL BCP-MCNC: 4.2 G/DL (ref 3.4–5)
ALP SERPL-CCNC: 106 U/L (ref 33–136)
ALT SERPL W P-5'-P-CCNC: 11 U/L (ref 7–45)
ANION GAP SERPL CALC-SCNC: 12 MMOL/L (ref 10–20)
AST SERPL W P-5'-P-CCNC: 15 U/L (ref 9–39)
BILIRUB SERPL-MCNC: 0.6 MG/DL (ref 0–1.2)
BUN SERPL-MCNC: 13 MG/DL (ref 6–23)
CALCIUM SERPL-MCNC: 9.3 MG/DL (ref 8.6–10.3)
CHLORIDE SERPL-SCNC: 105 MMOL/L (ref 98–107)
CHOLEST SERPL-MCNC: 157 MG/DL (ref 0–199)
CHOLESTEROL/HDL RATIO: 2.7
CO2 SERPL-SCNC: 28 MMOL/L (ref 21–32)
CREAT SERPL-MCNC: 0.58 MG/DL (ref 0.5–1.05)
EGFRCR SERPLBLD CKD-EPI 2021: >90 ML/MIN/1.73M*2
ERYTHROCYTE [DISTWIDTH] IN BLOOD BY AUTOMATED COUNT: 15.3 % (ref 11.5–14.5)
GLUCOSE SERPL-MCNC: 87 MG/DL (ref 74–99)
HCT VFR BLD AUTO: 43.9 % (ref 36–46)
HDLC SERPL-MCNC: 58.3 MG/DL
HGB BLD-MCNC: 13.8 G/DL (ref 12–16)
LDLC SERPL CALC-MCNC: 79 MG/DL
MCH RBC QN AUTO: 26.8 PG (ref 26–34)
MCHC RBC AUTO-ENTMCNC: 31.4 G/DL (ref 32–36)
MCV RBC AUTO: 85 FL (ref 80–100)
NON HDL CHOLESTEROL: 99 MG/DL (ref 0–149)
NRBC BLD-RTO: 0 /100 WBCS (ref 0–0)
PLATELET # BLD AUTO: 242 X10*3/UL (ref 150–450)
POTASSIUM SERPL-SCNC: 4.5 MMOL/L (ref 3.5–5.3)
PROT SERPL-MCNC: 6.5 G/DL (ref 6.4–8.2)
RBC # BLD AUTO: 5.15 X10*6/UL (ref 4–5.2)
SODIUM SERPL-SCNC: 140 MMOL/L (ref 136–145)
TRIGL SERPL-MCNC: 101 MG/DL (ref 0–149)
TSH SERPL-ACNC: 3.83 MIU/L (ref 0.44–3.98)
VLDL: 20 MG/DL (ref 0–40)
WBC # BLD AUTO: 7 X10*3/UL (ref 4.4–11.3)

## 2024-10-23 PROCEDURE — 77080 DXA BONE DENSITY AXIAL: CPT

## 2024-10-23 PROCEDURE — 99406 BEHAV CHNG SMOKING 3-10 MIN: CPT | Performed by: STUDENT IN AN ORGANIZED HEALTH CARE EDUCATION/TRAINING PROGRAM

## 2024-10-23 PROCEDURE — 85027 COMPLETE CBC AUTOMATED: CPT

## 2024-10-23 PROCEDURE — 99397 PER PM REEVAL EST PAT 65+ YR: CPT | Performed by: STUDENT IN AN ORGANIZED HEALTH CARE EDUCATION/TRAINING PROGRAM

## 2024-10-23 PROCEDURE — 84443 ASSAY THYROID STIM HORMONE: CPT

## 2024-10-23 PROCEDURE — G0296 VISIT TO DETERM LDCT ELIG: HCPCS | Performed by: STUDENT IN AN ORGANIZED HEALTH CARE EDUCATION/TRAINING PROGRAM

## 2024-10-23 PROCEDURE — 80053 COMPREHEN METABOLIC PANEL: CPT

## 2024-10-23 PROCEDURE — 80061 LIPID PANEL: CPT

## 2024-10-23 PROCEDURE — 77080 DXA BONE DENSITY AXIAL: CPT | Performed by: RADIOLOGY

## 2024-10-23 PROCEDURE — 1170F FXNL STATUS ASSESSED: CPT | Performed by: STUDENT IN AN ORGANIZED HEALTH CARE EDUCATION/TRAINING PROGRAM

## 2024-10-23 PROCEDURE — G0439 PPPS, SUBSEQ VISIT: HCPCS | Performed by: STUDENT IN AN ORGANIZED HEALTH CARE EDUCATION/TRAINING PROGRAM

## 2024-10-23 PROCEDURE — 36415 COLL VENOUS BLD VENIPUNCTURE: CPT

## 2024-10-23 PROCEDURE — G0444 DEPRESSION SCREEN ANNUAL: HCPCS | Performed by: STUDENT IN AN ORGANIZED HEALTH CARE EDUCATION/TRAINING PROGRAM

## 2024-10-23 PROCEDURE — 1159F MED LIST DOCD IN RCRD: CPT | Performed by: STUDENT IN AN ORGANIZED HEALTH CARE EDUCATION/TRAINING PROGRAM

## 2024-10-23 PROCEDURE — 3008F BODY MASS INDEX DOCD: CPT | Performed by: STUDENT IN AN ORGANIZED HEALTH CARE EDUCATION/TRAINING PROGRAM

## 2024-10-23 PROCEDURE — 1160F RVW MEDS BY RX/DR IN RCRD: CPT | Performed by: STUDENT IN AN ORGANIZED HEALTH CARE EDUCATION/TRAINING PROGRAM

## 2024-10-23 RX ORDER — AZITHROMYCIN 250 MG/1
250 TABLET, FILM COATED ORAL
COMMUNITY
Start: 2024-10-22

## 2024-10-23 SDOH — HEALTH STABILITY: PHYSICAL HEALTH: ON AVERAGE, HOW MANY DAYS PER WEEK DO YOU ENGAGE IN MODERATE TO STRENUOUS EXERCISE (LIKE A BRISK WALK)?: 4 DAYS

## 2024-10-23 SDOH — HEALTH STABILITY: PHYSICAL HEALTH: ON AVERAGE, HOW MANY MINUTES DO YOU ENGAGE IN EXERCISE AT THIS LEVEL?: 150+ MIN

## 2024-10-23 SDOH — ECONOMIC STABILITY: GENERAL
WHICH OF THE FOLLOWING DO YOU KNOW HOW TO USE AND HAVE ACCESS TO EVERY DAY? (CHOOSE ALL THAT APPLY): SMARTPHONE WITH CELLULAR DATA PLAN

## 2024-10-23 SDOH — ECONOMIC STABILITY: INCOME INSECURITY: IN THE LAST 12 MONTHS, WAS THERE A TIME WHEN YOU WERE NOT ABLE TO PAY THE MORTGAGE OR RENT ON TIME?: NO

## 2024-10-23 SDOH — ECONOMIC STABILITY: FOOD INSECURITY: WITHIN THE PAST 12 MONTHS, YOU WORRIED THAT YOUR FOOD WOULD RUN OUT BEFORE YOU GOT MONEY TO BUY MORE.: NEVER TRUE

## 2024-10-23 SDOH — ECONOMIC STABILITY: FOOD INSECURITY: WITHIN THE PAST 12 MONTHS, THE FOOD YOU BOUGHT JUST DIDN'T LAST AND YOU DIDN'T HAVE MONEY TO GET MORE.: NEVER TRUE

## 2024-10-23 ASSESSMENT — SOCIAL DETERMINANTS OF HEALTH (SDOH)
WITHIN THE LAST YEAR, HAVE TO BEEN RAPED OR FORCED TO HAVE ANY KIND OF SEXUAL ACTIVITY BY YOUR PARTNER OR EX-PARTNER?: NO
DO YOU BELONG TO ANY CLUBS OR ORGANIZATIONS SUCH AS CHURCH GROUPS UNIONS, FRATERNAL OR ATHLETIC GROUPS, OR SCHOOL GROUPS?: NO
HOW OFTEN DO YOU ATTEND CHURCH OR RELIGIOUS SERVICES?: MORE THAN 4 TIMES PER YEAR
HOW HARD IS IT FOR YOU TO PAY FOR THE VERY BASICS LIKE FOOD, HOUSING, MEDICAL CARE, AND HEATING?: NOT HARD AT ALL
HOW OFTEN DO YOU GET TOGETHER WITH FRIENDS OR RELATIVES?: NEVER
WITHIN THE LAST YEAR, HAVE YOU BEEN KICKED, HIT, SLAPPED, OR OTHERWISE PHYSICALLY HURT BY YOUR PARTNER OR EX-PARTNER?: NO
IN A TYPICAL WEEK, HOW MANY TIMES DO YOU TALK ON THE PHONE WITH FAMILY, FRIENDS, OR NEIGHBORS?: MORE THAN THREE TIMES A WEEK
IN THE PAST 12 MONTHS, HAS THE ELECTRIC, GAS, OIL, OR WATER COMPANY THREATENED TO SHUT OFF SERVICE IN YOUR HOME?: NO
WITHIN THE LAST YEAR, HAVE YOU BEEN HUMILIATED OR EMOTIONALLY ABUSED IN OTHER WAYS BY YOUR PARTNER OR EX-PARTNER?: NO
WITHIN THE LAST YEAR, HAVE YOU BEEN AFRAID OF YOUR PARTNER OR EX-PARTNER?: NO
HOW OFTEN DO YOU ATTENT MEETINGS OF THE CLUB OR ORGANIZATION YOU BELONG TO?: NEVER

## 2024-10-23 ASSESSMENT — ENCOUNTER SYMPTOMS
FLANK PAIN: 0
FEVER: 0
CONSTIPATION: 0
ARTHRALGIAS: 0
APPETITE CHANGE: 0
RHINORRHEA: 0
LIGHT-HEADEDNESS: 0
NAUSEA: 0
HEMATURIA: 0
VOMITING: 0
DIARRHEA: 0
DIZZINESS: 0
PALPITATIONS: 0
MYALGIAS: 0
DEPRESSION: 0
SHORTNESS OF BREATH: 0
BLOOD IN STOOL: 0
SINUS PAIN: 0
ABDOMINAL PAIN: 0
LOSS OF SENSATION IN FEET: 0
FATIGUE: 0
OCCASIONAL FEELINGS OF UNSTEADINESS: 0

## 2024-10-23 ASSESSMENT — ACTIVITIES OF DAILY LIVING (ADL)
MANAGING_FINANCES: INDEPENDENT
DRESSING: INDEPENDENT
TAKING_MEDICATION: INDEPENDENT
GROCERY_SHOPPING: INDEPENDENT
BATHING: INDEPENDENT
DRESSING: INDEPENDENT
BATHING: INDEPENDENT
DOING_HOUSEWORK: INDEPENDENT

## 2024-10-23 ASSESSMENT — LIFESTYLE VARIABLES
HOW MANY STANDARD DRINKS CONTAINING ALCOHOL DO YOU HAVE ON A TYPICAL DAY: PATIENT DOES NOT DRINK
HOW OFTEN DO YOU HAVE A DRINK CONTAINING ALCOHOL: NEVER

## 2024-10-23 NOTE — PROGRESS NOTES
Subjective   Diane Goetz is a 69 y.o. female who is here for a routine exam.  Active Problem List      Comprehensive Medical/Surgical/Social/Family History  Past Medical History:   Diagnosis Date    Personal history of other diseases of the respiratory system     History of chronic obstructive lung disease    Presence of dental prosthetic device (complete) (partial)     Full dentures     Past Surgical History:   Procedure Laterality Date    OTHER SURGICAL HISTORY  08/16/2021    Wrist surgery     Social History     Social History Narrative    Not on file       Allergies and Medications  Patient has no known allergies.  Current Outpatient Medications on File Prior to Visit   Medication Sig Dispense Refill    acetaminophen (Tylenol) 325 mg tablet Take 2 tablets (650 mg) by mouth every 6 hours if needed for moderate pain (4 - 6). 90 tablet 3    albuterol (Ventolin HFA) 90 mcg/actuation inhaler Inhale 2 puffs every 4 hours if needed for wheezing or shortness of breath. 8 g 11    atorvastatin (Lipitor) 40 mg tablet Take 1 tablet (40 mg) by mouth once daily. 90 tablet 3    azithromycin (Zithromax) 250 mg tablet Take 1 tablet (250 mg) by mouth.      cycloSPORINE (Restasis) 0.05 % ophthalmic emulsion Administer into affected eye(s).      fluticasone-umeclidin-vilanter (TRELEGY-ELLIPTA) 100-62.5-25 mcg blister with device Fluticasone 200 mcg-umeclid 62.5 mcg-vilant 25 mcg 1 each 0    gabapentin (Neurontin) 300 mg capsule Take 1 capsule (300 mg) by mouth 2 times a day. 180 capsule 3    levothyroxine (Synthroid, Levoxyl) 50 mcg tablet Take 1 tablet (50 mcg) by mouth once daily. 90 tablet 1    melatonin 10 mg capsule Take 1 capsule by mouth once daily 90 capsule 1    omeprazole OTC (PriLOSEC OTC) 20 mg EC tablet Take 1 tablet (20 mg) by mouth once daily in the morning. Take before meals. 90 tablet 1    diphenhydramine/Maalox/lidocaine (Magic Mouthwash) - Compounded - Outpatient Swish and spit 10 mL 4 times a day as needed (mouth  sores). (Patient not taking: Reported on 2024) 120 mL 0    fluticasone (Flonase) 50 mcg/actuation nasal spray Administer 1 spray into each nostril once daily. Shake gently. Before first use, prime pump. After use, clean tip and replace cap. (Patient not taking: Reported on 2024) 16 g 11    L. acidophilus/Bifid. animalis 10 billion cell tablet,chewable Chew 1 tablet once daily. (Patient not taking: Reported on 2024) 30 tablet 0    [] melatonin 3 mg capsule Take 5 mg by mouth once daily. 30 capsule 0    nicotine (Nicoderm CQ) 14 mg/24 hr patch Place 1 patch over 24 hours on the skin once every 24 hours. 30 patch 0    Retin-A 0.05 % cream APPLY  CREAM TOPICALLY ONCE DAILY TO  FACE  AT  BEDTIME (Patient not taking: Reported on 2024) 45 g 0    triamcinolone (Kenalog) 0.1 % cream Apply topically 2 times a day. Apply to affected area 1-2 times daily as needed. (Patient not taking: Reported on 2024) 80 g 1    varenicline (Chantix) 1 mg tablet Take 1 tablet (1 mg) by mouth 2 times a day. 240 tablet 0     No current facility-administered medications on file prior to visit.       New Concerns:  Increased stress - loss of grandchild.  Overall she is coping well, she is there for her daughter as well.    She is staying active, is still working, does not do a lot of sitting around.    No additional concerns today.    Lifestyle  Diet:  Healthy and Well Balanced  Physical Activity: Sufficiently Active (10/23/2024)    Exercise Vital Sign     Days of Exercise per Week: 4 days     Minutes of Exercise per Session: 150+ min      Tobacco Use: High Risk (10/23/2024)    Patient History     Smoking Tobacco Use: Every Day     Smokeless Tobacco Use: Never     Passive Exposure: Not on file      Alcohol Use: Not At Risk (10/23/2024)    AUDIT-C     Frequency of Alcohol Consumption: Never     Average Number of Drinks: Patient does not drink     Frequency of Binge Drinking: Not on file      Depression: Not at risk  "(10/23/2024)    PHQ-2     PHQ-2 Score: 0      Stress: No Stress Concern Present (10/23/2024)    [employer] - Occupational Stress Questionnaire     Feeling of Stress : Not at all       Consultants:  Patient Care Team:  Rudy Howe DO as PCP - General (Family Medicine)   [place]    Colorectal Screening:   cologuard  Date: 2023    Breast Screening:   Up To Date  History of abnormal mammogram: no  Family history of breast cancer: no    Abnormal uterine bleeding: None  Urinary incontinence: None    Dexa Scan: due today    Review of Systems   Constitutional:  Negative for appetite change, fatigue and fever.   HENT:  Negative for ear discharge, ear pain, hearing loss, postnasal drip, rhinorrhea and sinus pain.    Eyes:  Negative for visual disturbance.   Respiratory:  Negative for shortness of breath.    Cardiovascular:  Negative for chest pain, palpitations and leg swelling.   Gastrointestinal:  Negative for abdominal pain, blood in stool, constipation, diarrhea, nausea and vomiting.   Genitourinary:  Negative for flank pain and hematuria.   Musculoskeletal:  Negative for arthralgias and myalgias.   Skin:  Negative for rash.   Neurological:  Negative for dizziness and light-headedness.   All other systems reviewed and are negative.      Objective   /53 (BP Location: Right arm, Patient Position: Sitting)   Pulse 79   Temp 36.4 °C (97.5 °F) (Temporal)   Resp 16   Ht 1.499 m (4' 11\")   Wt 49 kg (108 lb)   SpO2 94%   BMI 21.81 kg/m²     Physical Exam  Vitals reviewed.   Constitutional:       General: She is not in acute distress.     Appearance: Normal appearance. She is normal weight. She is not toxic-appearing.   HENT:      Head: Normocephalic and atraumatic.      Right Ear: Tympanic membrane and ear canal normal.      Left Ear: Tympanic membrane and ear canal normal.      Nose: Nose normal. No congestion or rhinorrhea.      Mouth/Throat:      Mouth: Mucous membranes are dry. "   Eyes:      General: No scleral icterus.     Extraocular Movements: Extraocular movements intact.      Conjunctiva/sclera: Conjunctivae normal.      Pupils: Pupils are equal, round, and reactive to light.   Cardiovascular:      Rate and Rhythm: Normal rate and regular rhythm.      Heart sounds: No murmur heard.     No friction rub. No gallop.   Pulmonary:      Effort: Pulmonary effort is normal. No respiratory distress.      Breath sounds: Normal breath sounds. No wheezing, rhonchi or rales.   Abdominal:      General: Abdomen is flat. There is no distension.      Palpations: Abdomen is soft.      Tenderness: There is no abdominal tenderness. There is no guarding.   Musculoskeletal:         General: Normal range of motion.      Cervical back: Normal range of motion and neck supple.      Right lower leg: No edema.      Left lower leg: No edema.   Lymphadenopathy:      Cervical: No cervical adenopathy.   Skin:     General: Skin is warm and dry.   Neurological:      General: No focal deficit present.      Mental Status: She is alert and oriented to person, place, and time.   Psychiatric:         Mood and Affect: Mood normal.         Behavior: Behavior normal.         Assessment/Plan   Problem List Items Addressed This Visit       COPD (chronic obstructive pulmonary disease) (Multi)    Relevant Orders    CBC (Completed)    Comprehensive Metabolic Panel (Completed)    Lipid Panel (Completed)    Hyperlipidemia, mild    Relevant Orders    CBC (Completed)    Comprehensive Metabolic Panel (Completed)    Lipid Panel (Completed)    Hypothyroid - Primary    Relevant Orders    TSH with reflex to Free T4 if abnormal (Completed)     Other Visit Diagnoses       Osteoporosis screening        Relevant Orders    XR DEXA bone density    Encounter for osteoporosis screening in asymptomatic postmenopausal patient        Relevant Orders    XR DEXA bone density    Routine general medical examination at health care facility        Relevant  Orders    1 Year Follow Up In [facility] - PCP - Wellness Exam            Reviewed Social Determinants of health with patient, discussed healthy lifestyle including 150 minutes of physical activity per week  Ordered/Reviewed baseline labwork -CBC, CMP, Lipid Panel  Immunizations: Up To Date  Mammogram up-to-date  Dexa ordered today  Colorectal Screen Cologuard 2023    Health Counseling    Depression Screening  5 - 10 minutes were spent screening for depression.    Low Dose CT Screening  We discussed the pros and cons of low dose CT screening for lung cancer based on the patient's smoking history.  This screening is recommended for patients who:  Are between the age of 50 to 77  Have a 20 pack-year smoking history (20 years of smoking a pack a day)  Are either a current smoker or have quit smoking within the last 15 years  Are in good health - no new cough or unexplained weight loss  Are willing to do the follow-up testing and treatment, if needed  Have not had a chest CT (CAT) scan in the last year    Tobacco Counseling  5 - 10 minutes were spent counseling the patient on tobacco cessation.  Benefits of cessation were discussed as well as techniques to help quit.

## 2024-10-24 ENCOUNTER — TELEPHONE (OUTPATIENT)
Dept: PRIMARY CARE | Facility: CLINIC | Age: 69
End: 2024-10-24
Payer: COMMERCIAL

## 2024-10-30 ENCOUNTER — APPOINTMENT (OUTPATIENT)
Dept: PRIMARY CARE | Facility: CLINIC | Age: 69
End: 2024-10-30
Payer: COMMERCIAL

## 2024-10-30 VITALS
SYSTOLIC BLOOD PRESSURE: 120 MMHG | RESPIRATION RATE: 16 BRPM | DIASTOLIC BLOOD PRESSURE: 66 MMHG | TEMPERATURE: 97.9 F | HEART RATE: 76 BPM | WEIGHT: 107 LBS | OXYGEN SATURATION: 96 % | BODY MASS INDEX: 21.57 KG/M2 | HEIGHT: 59 IN

## 2024-10-30 DIAGNOSIS — L84 CORN OR CALLUS: ICD-10-CM

## 2024-10-30 DIAGNOSIS — B07.0 PLANTAR WART: Primary | ICD-10-CM

## 2024-10-30 DIAGNOSIS — H91.93 BILATERAL HEARING LOSS, UNSPECIFIED HEARING LOSS TYPE: ICD-10-CM

## 2024-10-30 PROCEDURE — 17110 DESTRUCTION B9 LES UP TO 14: CPT | Performed by: STUDENT IN AN ORGANIZED HEALTH CARE EDUCATION/TRAINING PROGRAM

## 2024-10-30 PROCEDURE — 3008F BODY MASS INDEX DOCD: CPT

## 2024-10-30 PROCEDURE — 99213 OFFICE O/P EST LOW 20 MIN: CPT

## 2024-10-30 ASSESSMENT — ENCOUNTER SYMPTOMS
PALPITATIONS: 0
ABDOMINAL PAIN: 0
DIARRHEA: 0
FEVER: 0
FATIGUE: 0
CONSTIPATION: 0
UNEXPECTED WEIGHT CHANGE: 0
NAUSEA: 0

## 2025-02-12 DIAGNOSIS — K21.9 GASTROESOPHAGEAL REFLUX DISEASE WITHOUT ESOPHAGITIS: Primary | ICD-10-CM

## 2025-02-12 RX ORDER — OMEPRAZOLE 20 MG/1
20 CAPSULE, DELAYED RELEASE ORAL
Qty: 90 CAPSULE | Refills: 0 | Status: SHIPPED | OUTPATIENT
Start: 2025-02-12

## 2025-02-13 ENCOUNTER — OFFICE VISIT (OUTPATIENT)
Dept: PRIMARY CARE | Facility: CLINIC | Age: 70
End: 2025-02-13
Payer: COMMERCIAL

## 2025-02-13 VITALS
OXYGEN SATURATION: 95 % | SYSTOLIC BLOOD PRESSURE: 136 MMHG | HEART RATE: 87 BPM | TEMPERATURE: 98 F | DIASTOLIC BLOOD PRESSURE: 79 MMHG | RESPIRATION RATE: 16 BRPM | BODY MASS INDEX: 23.05 KG/M2 | WEIGHT: 114.13 LBS

## 2025-02-13 DIAGNOSIS — C14.0 THROAT CANCER (MULTI): ICD-10-CM

## 2025-02-13 DIAGNOSIS — I73.9 PAD (PERIPHERAL ARTERY DISEASE) (CMS-HCC): Primary | ICD-10-CM

## 2025-02-13 PROCEDURE — 1159F MED LIST DOCD IN RCRD: CPT | Performed by: STUDENT IN AN ORGANIZED HEALTH CARE EDUCATION/TRAINING PROGRAM

## 2025-02-13 PROCEDURE — 99214 OFFICE O/P EST MOD 30 MIN: CPT | Performed by: STUDENT IN AN ORGANIZED HEALTH CARE EDUCATION/TRAINING PROGRAM

## 2025-02-13 PROCEDURE — G2211 COMPLEX E/M VISIT ADD ON: HCPCS | Performed by: STUDENT IN AN ORGANIZED HEALTH CARE EDUCATION/TRAINING PROGRAM

## 2025-02-13 RX ORDER — AMLODIPINE BESYLATE 2.5 MG/1
2.5 TABLET ORAL DAILY
Qty: 90 TABLET | Refills: 0 | Status: SHIPPED | OUTPATIENT
Start: 2025-02-13 | End: 2025-05-14

## 2025-02-13 ASSESSMENT — ENCOUNTER SYMPTOMS
LOSS OF SENSATION IN FEET: 0
OCCASIONAL FEELINGS OF UNSTEADINESS: 0
DEPRESSION: 0

## 2025-02-14 NOTE — PROGRESS NOTES
Subjective   Diane Goetz is a 69 y.o. female who presents for Circulatory Problem (Pt here today c/o cold hands especially the left. Pt requesting lab work).  Patient seen today for cold extremities, she is at cold hands worsening over this winter, worse over the past 2 months.  States that anytime they go outside they get very red, slightly tender, mild numbness and tingling, and take much longer than the rest of her body to heat up.  Does get sensitivity to touch with this.  Is a current smoker history of COPD    , No known coronary artery disease.    No improvement with interventions, slowly improved with heat.        Review of Systems    Objective   Physical Exam  Vitals reviewed.   Constitutional:       General: She is not in acute distress.     Appearance: Normal appearance. She is not toxic-appearing.   HENT:      Head: Normocephalic and atraumatic.      Nose: Nose normal.   Eyes:      Extraocular Movements: Extraocular movements intact.   Cardiovascular:      Rate and Rhythm: Normal rate and regular rhythm.      Heart sounds: No murmur heard.     No friction rub. No gallop.   Pulmonary:      Effort: Pulmonary effort is normal. No respiratory distress.      Breath sounds: Normal breath sounds. No wheezing, rhonchi or rales.   Skin:     General: Skin is warm and dry.   Neurological:      General: No focal deficit present.      Mental Status: She is alert.   Psychiatric:         Mood and Affect: Mood normal.         Behavior: Behavior normal.         Assessment/Plan   Problem List Items Addressed This Visit       Throat cancer (Multi)     Other Visit Diagnoses       PAD (peripheral artery disease) (CMS-[facility])    -  Primary    Relevant Medications    amLODIPine (Norvasc) 2.5 mg tablet    Other Relevant Orders    Vascular US upper extremity arterial duplex bilateral            Patient seen today for cold upper extremities.  We discussed Raynaud's versus peripheral arterial disease, as patient does have risk factors  such as smoking, COPD and whether symptoms more likely peripheral arterial disease over Raynaud's phenomenon.  We will get a vascular duplex ultrasound, Rx amlodipine for symptomatic improvement, blood pressure currently well-controlled today.    We will order vascular duplex ultrasound, consider rheumatologic evaluation if this is normal.

## 2025-02-19 ENCOUNTER — HOSPITAL ENCOUNTER (OUTPATIENT)
Dept: CARDIOLOGY | Facility: HOSPITAL | Age: 70
Discharge: HOME | End: 2025-02-19
Payer: COMMERCIAL

## 2025-02-19 DIAGNOSIS — I73.9 PAD (PERIPHERAL ARTERY DISEASE) (CMS-HCC): ICD-10-CM

## 2025-02-19 PROCEDURE — 93923 UPR/LXTR ART STDY 3+ LVLS: CPT | Performed by: SURGERY

## 2025-02-19 PROCEDURE — 93923 UPR/LXTR ART STDY 3+ LVLS: CPT

## 2025-04-11 DIAGNOSIS — F51.01 PRIMARY INSOMNIA: ICD-10-CM

## 2025-04-11 RX ORDER — MELATONIN 10 MG
10 CAPSULE ORAL DAILY
Qty: 90 CAPSULE | Refills: 0 | Status: SHIPPED | OUTPATIENT
Start: 2025-04-11

## 2025-05-08 DIAGNOSIS — I73.9 PAD (PERIPHERAL ARTERY DISEASE) (CMS-HCC): ICD-10-CM

## 2025-05-08 RX ORDER — AMLODIPINE BESYLATE 2.5 MG/1
2.5 TABLET ORAL DAILY
Qty: 90 TABLET | Refills: 0 | Status: SHIPPED | OUTPATIENT
Start: 2025-05-08

## 2025-06-05 ENCOUNTER — OFFICE VISIT (OUTPATIENT)
Dept: PRIMARY CARE | Facility: CLINIC | Age: 70
End: 2025-06-05
Payer: MEDICARE

## 2025-06-05 VITALS
WEIGHT: 112.2 LBS | DIASTOLIC BLOOD PRESSURE: 62 MMHG | TEMPERATURE: 97.5 F | RESPIRATION RATE: 16 BRPM | HEART RATE: 76 BPM | OXYGEN SATURATION: 94 % | HEIGHT: 59 IN | BODY MASS INDEX: 22.62 KG/M2 | SYSTOLIC BLOOD PRESSURE: 102 MMHG

## 2025-06-05 DIAGNOSIS — E55.9 VITAMIN D DEFICIENCY: ICD-10-CM

## 2025-06-05 DIAGNOSIS — L25.9 CONTACT DERMATITIS, UNSPECIFIED CONTACT DERMATITIS TYPE, UNSPECIFIED TRIGGER: ICD-10-CM

## 2025-06-05 DIAGNOSIS — J44.9 CHRONIC OBSTRUCTIVE PULMONARY DISEASE, UNSPECIFIED COPD TYPE (MULTI): ICD-10-CM

## 2025-06-05 DIAGNOSIS — R09.81 NASAL CONGESTION: Primary | ICD-10-CM

## 2025-06-05 DIAGNOSIS — K21.9 GASTROESOPHAGEAL REFLUX DISEASE WITHOUT ESOPHAGITIS: ICD-10-CM

## 2025-06-05 DIAGNOSIS — E03.9 HYPOTHYROIDISM, UNSPECIFIED TYPE: ICD-10-CM

## 2025-06-05 DIAGNOSIS — G62.9 NEUROPATHY: ICD-10-CM

## 2025-06-05 PROCEDURE — 99214 OFFICE O/P EST MOD 30 MIN: CPT

## 2025-06-05 PROCEDURE — 1159F MED LIST DOCD IN RCRD: CPT

## 2025-06-05 PROCEDURE — G2211 COMPLEX E/M VISIT ADD ON: HCPCS

## 2025-06-05 PROCEDURE — 3008F BODY MASS INDEX DOCD: CPT

## 2025-06-05 RX ORDER — IPRATROPIUM BROMIDE 42 UG/1
1 SPRAY, METERED NASAL 4 TIMES DAILY
Qty: 15 ML | Refills: 1 | Status: SHIPPED | OUTPATIENT
Start: 2025-06-05 | End: 2025-06-05

## 2025-06-05 RX ORDER — GABAPENTIN 300 MG/1
300 CAPSULE ORAL 2 TIMES DAILY
Qty: 180 CAPSULE | Refills: 3 | Status: SHIPPED | OUTPATIENT
Start: 2025-06-05 | End: 2026-06-05

## 2025-06-05 RX ORDER — IPRATROPIUM BROMIDE 42 UG/1
1 SPRAY, METERED NASAL 2 TIMES DAILY PRN
Qty: 15 ML | Refills: 1 | Status: SHIPPED | OUTPATIENT
Start: 2025-06-05 | End: 2026-06-05

## 2025-06-05 RX ORDER — TRETINOIN 0.5 MG/G
CREAM TOPICAL NIGHTLY
Qty: 45 G | Refills: 0 | Status: SHIPPED | OUTPATIENT
Start: 2025-06-05

## 2025-06-05 RX ORDER — AZITHROMYCIN 250 MG/1
250 TABLET, FILM COATED ORAL 3 TIMES WEEKLY
COMMUNITY

## 2025-06-05 RX ORDER — FLUTICASONE PROPIONATE 50 MCG
1 SPRAY, SUSPENSION (ML) NASAL DAILY
Qty: 16 G | Refills: 11 | Status: SHIPPED | OUTPATIENT
Start: 2025-06-05 | End: 2026-06-05

## 2025-06-05 RX ORDER — OMEPRAZOLE 20 MG/1
20 CAPSULE, DELAYED RELEASE ORAL
Qty: 90 CAPSULE | Refills: 0 | Status: SHIPPED | OUTPATIENT
Start: 2025-06-05

## 2025-06-05 NOTE — ASSESSMENT & PLAN NOTE
Patient requesting refill for her Trelegy.  No current COPD exacerbation based on assessment today.  Stable  Orders:    fluticasone-umeclidin-vilanter (TRELEGY-ELLIPTA) 100-62.5-25 mcg blister with device; Use as instructed daily. Fluticasone 200 mcg-umeclid 62.5 mcg-vilant 25 mcg

## 2025-06-05 NOTE — ASSESSMENT & PLAN NOTE
Patient requesting refill for gabapentin for her neuropathy.  Stable.  Medications refilled.  Orders:    gabapentin (Neurontin) 300 mg capsule; Take 1 capsule (300 mg) by mouth 2 times a day.

## 2025-06-05 NOTE — ASSESSMENT & PLAN NOTE
History of hypothyroidism.  Will check TSH make sure dosing for replacement is appropriate.  No current symptoms of hypothyroidism  Orders:    TSH with reflex to Free T4 if abnormal; Future

## 2025-06-05 NOTE — PROGRESS NOTES
Subjective   Diane Goetz is a 69 y.o. female who presents for Sick Visit (Pt states she think she is overdue for some blood work. Pt states she needs refills on gabapentin, omeprazole, retina cream), Earache (It started off with right ear it went away then the left ear pain came tried cleaning it out but it hurts been going on for 10 days), Sinus Problem (Started 3 days ago hurts when chewing and can feel bubbles in ear with chewing with pressure in the face), and Cough.    Patient reports sensation of bilateral ear fullness for the past 1 to 2 weeks as well as postnasal drip and some congestion.  She does have a history of COPD and uses Trelegy as well as albuterol as needed.  She also is on 2 L oxygen at night.  She says that the congestion nasally makes it difficult to take full breaths, however she denies any true dyspnea.  She used her albuterol this morning, but otherwise has not needed to use it more frequently.  Denies fevers, chills, dyspnea, chest pain, abdominal pain, nausea, vomiting.  Has been taking Tylenol without much improvement.    Patient is also requesting several medication refills and lab work to be done for her thyroid and vitamin D levels.      Visit Vitals  /62 (BP Location: Right arm, Patient Position: Sitting, BP Cuff Size: Adult)   Pulse 76   Temp 36.4 °C (97.5 °F) (Temporal)   Resp 16      Objective   Physical Exam  Vitals reviewed.   Constitutional:       General: She is not in acute distress.     Appearance: Normal appearance. She is not toxic-appearing.   HENT:      Head: Normocephalic and atraumatic.      Right Ear: Tympanic membrane, ear canal and external ear normal. There is no impacted cerumen.      Left Ear: Tympanic membrane, ear canal and external ear normal. There is no impacted cerumen.      Nose: Nose normal. No congestion or rhinorrhea.   Eyes:      Extraocular Movements: Extraocular movements intact.   Cardiovascular:      Rate and Rhythm: Normal rate and regular  rhythm.      Heart sounds: No murmur heard.     No friction rub. No gallop.   Pulmonary:      Effort: Pulmonary effort is normal. No respiratory distress.      Breath sounds: Normal breath sounds. No wheezing, rhonchi or rales.   Skin:     General: Skin is warm and dry.   Neurological:      General: No focal deficit present.      Mental Status: She is alert.   Psychiatric:         Mood and Affect: Mood normal.         Behavior: Behavior normal.            Assessment/Plan      Assessment & Plan  Nasal congestion  Patient presents with several days to week of nasal congestion and ear fullness.  Do not believe that this is infectious etiology.  Consistent with seasonal allergies and mild to moderate eustachian tube fullness.  Will refrain from antibiotics at this time and instead treat symptomatically with Atrovent nasal spray and Flonase.  Orders:    fluticasone (Flonase) 50 mcg/actuation nasal spray; Administer 1 spray into each nostril once daily. Shake gently. Before first use, prime pump. After use, clean tip and replace cap.    ipratropium (Atrovent) 42 mcg (0.06 %) nasal spray; Administer 1 spray into each nostril 2 times a day as needed for rhinitis.    Neuropathy  Patient requesting refill for gabapentin for her neuropathy.  Stable.  Medications refilled.  Orders:    gabapentin (Neurontin) 300 mg capsule; Take 1 capsule (300 mg) by mouth 2 times a day.    Gastroesophageal reflux disease without esophagitis  Stable on omeprazole 20 mg daily.  Orders:    omeprazole (PriLOSEC) 20 mg DR capsule; Take 1 capsule (20 mg) by mouth once daily in the morning. Take before meals.    Contact dermatitis, unspecified contact dermatitis type, unspecified trigger  Stable and requiring refill.  Orders:    Retin-A 0.05 % cream; Apply topically once daily at bedtime.    Chronic obstructive pulmonary disease, unspecified COPD type (Multi)  Patient requesting refill for her Trelegy.  No current COPD exacerbation based on assessment  "today.  Stable  Orders:    fluticasone-umeclidin-vilanter (TRELEGY-ELLIPTA) 100-62.5-25 mcg blister with device; Use as instructed daily. Fluticasone 200 mcg-umeclid 62.5 mcg-vilant 25 mcg    Vitamin D deficiency  Patient supplements daily vitamin D.  Will check levels to make sure she is at appropriate dose.  Orders:    Vitamin D 25-Hydroxy,Total (for eval of Vitamin D levels); Future    Hypothyroidism, unspecified type  History of hypothyroidism.  Will check TSH make sure dosing for replacement is appropriate.  No current symptoms of hypothyroidism  Orders:    TSH with reflex to Free T4 if abnormal; Future           This note was partially created using voice recognition software and is inherently subject to errors including those of syntax and \"sound-alike\" substitutions which may escape proofreading. In such instances, original meaning may be extrapolated by contextual derivation.      "

## 2025-07-12 LAB
25(OH)D3+25(OH)D2 SERPL-MCNC: 32 NG/ML (ref 30–100)
TSH SERPL-ACNC: 1.52 MIU/L (ref 0.4–4.5)

## 2025-07-31 DIAGNOSIS — E03.9 HYPOTHYROIDISM, UNSPECIFIED TYPE: ICD-10-CM

## 2025-07-31 RX ORDER — LEVOTHYROXINE SODIUM 50 UG/1
50 TABLET ORAL DAILY
Qty: 90 TABLET | Refills: 3 | Status: SHIPPED | OUTPATIENT
Start: 2025-07-31

## 2025-08-01 ENCOUNTER — TELEPHONE (OUTPATIENT)
Dept: PRIMARY CARE | Facility: CLINIC | Age: 70
End: 2025-08-01

## 2025-08-01 ENCOUNTER — OFFICE VISIT (OUTPATIENT)
Dept: PRIMARY CARE | Facility: CLINIC | Age: 70
End: 2025-08-01
Payer: MEDICARE

## 2025-08-01 VITALS
RESPIRATION RATE: 22 BRPM | OXYGEN SATURATION: 95 % | HEART RATE: 83 BPM | DIASTOLIC BLOOD PRESSURE: 52 MMHG | TEMPERATURE: 97.8 F | BODY MASS INDEX: 22.62 KG/M2 | WEIGHT: 112 LBS | SYSTOLIC BLOOD PRESSURE: 121 MMHG

## 2025-08-01 DIAGNOSIS — Z12.31 ENCOUNTER FOR SCREENING MAMMOGRAM FOR MALIGNANT NEOPLASM OF BREAST: Primary | ICD-10-CM

## 2025-08-01 DIAGNOSIS — J44.9 CHRONIC OBSTRUCTIVE PULMONARY DISEASE, UNSPECIFIED COPD TYPE (MULTI): ICD-10-CM

## 2025-08-01 DIAGNOSIS — Z87.891 PERSONAL HISTORY OF NICOTINE DEPENDENCE: ICD-10-CM

## 2025-08-01 DIAGNOSIS — F17.200 TOBACCO DEPENDENCE: ICD-10-CM

## 2025-08-01 DIAGNOSIS — J44.9 CHRONIC OBSTRUCTIVE PULMONARY DISEASE, UNSPECIFIED COPD TYPE (MULTI): Primary | ICD-10-CM

## 2025-08-01 DIAGNOSIS — F51.01 PRIMARY INSOMNIA: ICD-10-CM

## 2025-08-01 PROCEDURE — G2211 COMPLEX E/M VISIT ADD ON: HCPCS | Performed by: STUDENT IN AN ORGANIZED HEALTH CARE EDUCATION/TRAINING PROGRAM

## 2025-08-01 PROCEDURE — 1160F RVW MEDS BY RX/DR IN RCRD: CPT | Performed by: STUDENT IN AN ORGANIZED HEALTH CARE EDUCATION/TRAINING PROGRAM

## 2025-08-01 PROCEDURE — 99213 OFFICE O/P EST LOW 20 MIN: CPT | Performed by: STUDENT IN AN ORGANIZED HEALTH CARE EDUCATION/TRAINING PROGRAM

## 2025-08-01 PROCEDURE — 1159F MED LIST DOCD IN RCRD: CPT | Performed by: STUDENT IN AN ORGANIZED HEALTH CARE EDUCATION/TRAINING PROGRAM

## 2025-08-01 RX ORDER — AZITHROMYCIN 250 MG/1
250 TABLET, FILM COATED ORAL 3 TIMES WEEKLY
Qty: 36 TABLET | Refills: 0 | Status: SHIPPED | OUTPATIENT
Start: 2025-08-01

## 2025-08-01 RX ORDER — BUDESONIDE, GLYCOPYRROLATE, AND FORMOTEROL FUMARATE 160; 9; 4.8 UG/1; UG/1; UG/1
2 AEROSOL, METERED RESPIRATORY (INHALATION)
Qty: 24 G | Refills: 11 | Status: SHIPPED | OUTPATIENT
Start: 2025-08-01

## 2025-08-01 RX ORDER — PREDNISONE 10 MG/1
TABLET ORAL
Qty: 42 TABLET | Refills: 0 | Status: SHIPPED | OUTPATIENT
Start: 2025-08-01 | End: 2025-08-13

## 2025-08-01 ASSESSMENT — ENCOUNTER SYMPTOMS
VOMITING: 0
NAUSEA: 0
SHORTNESS OF BREATH: 1
DIZZINESS: 0
FEVER: 0
LIGHT-HEADEDNESS: 0

## 2025-08-01 NOTE — TELEPHONE ENCOUNTER
Pt left message to request orders for mammogram, CT lung scan, and refill of the azithromycin. Thanks

## 2025-08-01 NOTE — PROGRESS NOTES
Subjective   Diane Goetz is a 70 y.o. female who presents for Shortness of Breath (Patient here to discuss trouble breathing and a productive cough with yellow mucus. ).  Patient seen today for follow-up, she stated she failed a 6-minute walk test and pulmonology recommended that she start on oxygen regularly.  She works at [employer] and is concerned she will not be able to do so with oxygen.  She does use oxygen at night and states that there is improvement with this.    She feels that she has a COPD exacerbation due to heat, has shortness of breath when outside currently.    She is on Trelegy would like to switch to Breztri.  She is on 3 times weekly azithromycin would like to continue on this.    Patient like another opinion from another pulmonologist.    Shortness of Breath  Pertinent negatives include no chest pain, fever or vomiting.       Review of Systems   Constitutional:  Negative for fever.   Respiratory:  Positive for shortness of breath.    Cardiovascular:  Negative for chest pain.   Gastrointestinal:  Negative for nausea and vomiting.   Neurological:  Negative for dizziness and light-headedness.   All other systems reviewed and are negative.      Objective   Physical Exam  Vitals reviewed.   Constitutional:       General: She is not in acute distress.     Appearance: Normal appearance. She is not toxic-appearing.   HENT:      Head: Normocephalic and atraumatic.      Nose: Nose normal.     Eyes:      Extraocular Movements: Extraocular movements intact.       Cardiovascular:      Rate and Rhythm: Normal rate and regular rhythm.      Heart sounds: No murmur heard.     No friction rub. No gallop.   Pulmonary:      Effort: No respiratory distress.      Breath sounds: Wheezing present. No rhonchi or rales.   Chest:      Chest wall: Tenderness present.     Skin:     General: Skin is warm and dry.     Neurological:      General: No focal deficit present.      Mental Status: She is alert.     Psychiatric:          Mood and Affect: Mood normal.         Behavior: Behavior normal.         Assessment/Plan   Problem List Items Addressed This Visit       COPD (chronic obstructive pulmonary disease) (Multi) - Primary    Relevant Medications    predniSONE (Deltasone) 10 mg tablet    budesonide-glycopyr-formoterol (Breztri Aerosphere) 160-9-4.8 mcg/actuation HFA aerosol inhaler    Other Relevant Orders    Referral to Pulmonology     Patient seen today for COPD, likely exacerbation.    We will switch her Trelegy to Breztri.    Rx prednisone taper.    We will refer patient to pulmonology for second opinion.    Follow-up as new concerns arise.

## 2025-08-04 ENCOUNTER — TELEPHONE (OUTPATIENT)
Dept: PRIMARY CARE | Facility: CLINIC | Age: 70
End: 2025-08-04
Payer: MEDICARE

## 2025-08-04 DIAGNOSIS — I73.9 PAD (PERIPHERAL ARTERY DISEASE): ICD-10-CM

## 2025-08-04 RX ORDER — MIRTAZAPINE 7.5 MG/1
7.5 TABLET, FILM COATED ORAL NIGHTLY
Qty: 30 TABLET | Refills: 0 | Status: SHIPPED | OUTPATIENT
Start: 2025-08-04 | End: 2025-09-03

## 2025-08-04 NOTE — TELEPHONE ENCOUNTER
Pt called to request something because she is not able to sleep. Pt stated that she has tried Tylenol PM and other medications but still is not able to.

## 2025-08-05 RX ORDER — AMLODIPINE BESYLATE 2.5 MG/1
2.5 TABLET ORAL DAILY
Qty: 90 TABLET | Refills: 0 | Status: SHIPPED | OUTPATIENT
Start: 2025-08-05

## 2025-08-25 ENCOUNTER — APPOINTMENT (OUTPATIENT)
Facility: CLINIC | Age: 70
End: 2025-08-25
Payer: MEDICARE

## 2025-08-25 ASSESSMENT — ENCOUNTER SYMPTOMS
OCCASIONAL FEELINGS OF UNSTEADINESS: 0
LOSS OF SENSATION IN FEET: 0
DEPRESSION: 0

## 2025-08-25 ASSESSMENT — COPD QUESTIONNAIRES
QUESTION4_WALKINCLINE: 5 - WHEN I WALK UP A HILL OR ONE FLIGHT OF STAIRS I AM VERY BREATHLESS
CAT_TOTALSCORE: 22
QUESTION3_CHESTTIGHTNESS: 3
QUESTION7_SLEEPQUALITY: 1
QUESTION5_HOMEACTIVITIES: 5 - I AM VERY LIMITED DOING ACTIVITIES AT HOME
QUESTION6_LEAVINGHOUSE: 1
QUESTION2_CHESTPHLEGM: 3
QUESTION1_COUGHFREQUENCY: 2
QUESTION8_ENERGYLEVEL: 2

## 2025-08-25 ASSESSMENT — COLUMBIA-SUICIDE SEVERITY RATING SCALE - C-SSRS
6. HAVE YOU EVER DONE ANYTHING, STARTED TO DO ANYTHING, OR PREPARED TO DO ANYTHING TO END YOUR LIFE?: NO
1. IN THE PAST MONTH, HAVE YOU WISHED YOU WERE DEAD OR WISHED YOU COULD GO TO SLEEP AND NOT WAKE UP?: NO
2. HAVE YOU ACTUALLY HAD ANY THOUGHTS OF KILLING YOURSELF?: NO

## 2025-08-25 ASSESSMENT — PATIENT HEALTH QUESTIONNAIRE - PHQ9
1. LITTLE INTEREST OR PLEASURE IN DOING THINGS: NOT AT ALL
SUM OF ALL RESPONSES TO PHQ9 QUESTIONS 1 AND 2: 0
2. FEELING DOWN, DEPRESSED OR HOPELESS: NOT AT ALL

## 2025-08-29 DIAGNOSIS — F51.01 PRIMARY INSOMNIA: ICD-10-CM

## 2025-09-03 RX ORDER — MIRTAZAPINE 7.5 MG/1
7.5 TABLET, FILM COATED ORAL NIGHTLY
Qty: 30 TABLET | Refills: 0 | Status: SHIPPED | OUTPATIENT
Start: 2025-09-03

## 2025-10-22 ENCOUNTER — APPOINTMENT (OUTPATIENT)
Facility: CLINIC | Age: 70
End: 2025-10-22
Payer: MEDICARE

## 2025-10-31 ENCOUNTER — APPOINTMENT (OUTPATIENT)
Dept: PRIMARY CARE | Facility: CLINIC | Age: 70
End: 2025-10-31
Payer: COMMERCIAL